# Patient Record
Sex: MALE | Race: WHITE | NOT HISPANIC OR LATINO | Employment: OTHER | URBAN - METROPOLITAN AREA
[De-identification: names, ages, dates, MRNs, and addresses within clinical notes are randomized per-mention and may not be internally consistent; named-entity substitution may affect disease eponyms.]

---

## 2017-03-23 ENCOUNTER — CLINICAL SUPPORT NO REQUIREMENTS (OUTPATIENT)
Dept: CARDIOLOGY | Facility: CLINIC | Age: 67
End: 2017-03-23

## 2017-03-23 DIAGNOSIS — I25.5 ISCHEMIC CARDIOMYOPATHY: ICD-10-CM

## 2017-03-23 PROCEDURE — 93295 DEV INTERROG REMOTE 1/2/MLT: CPT | Performed by: INTERNAL MEDICINE

## 2017-03-23 PROCEDURE — 93296 REM INTERROG EVL PM/IDS: CPT | Performed by: INTERNAL MEDICINE

## 2017-06-22 ENCOUNTER — OFFICE VISIT (OUTPATIENT)
Dept: CARDIOLOGY | Facility: CLINIC | Age: 67
End: 2017-06-22

## 2017-06-22 VITALS — DIASTOLIC BLOOD PRESSURE: 68 MMHG | SYSTOLIC BLOOD PRESSURE: 110 MMHG

## 2017-06-22 DIAGNOSIS — Z95.810 ICD (IMPLANTABLE CARDIOVERTER-DEFIBRILLATOR) IN PLACE: Primary | ICD-10-CM

## 2017-06-22 DIAGNOSIS — I25.5 ISCHEMIC CARDIOMYOPATHY: ICD-10-CM

## 2017-06-22 PROCEDURE — 93289 INTERROG DEVICE EVAL HEART: CPT | Performed by: PHYSICIAN ASSISTANT

## 2017-06-22 NOTE — PROGRESS NOTES
Primary Provider:  Tre Turner MD  CC:ICM      PROBLEM LIST:   1. Coronary artery disease:  a. Exertional dyspnea/angina equivalent.  b. Abnormal EKG showing inferior wall MI of undetermined age and lateral ischemia done as preoperative evaluation before back surgery.  c. Cardiac cath by Dr. Mathews, 10/25/2006, showed severe triple vascular disease with moderate LV dysfunction and ejection fraction of 35% with diastolic dysfunction.  d. Subsequent CABG x4 by Dr. Garcia receiving LIMA to LAD, vein graft to the obtuse marginal, vein to the PDA and vein to the diagonal.  e. MPS, 11/29/2011: No reversible ischemia. Fixed inferior wall and inferolateral wall defects. EF 41%.  2. Ischemic cardiomyopathy:  a. Echocardiogram repeated today, 04/27/2007, shows persistent LV dysfunction with ejection fraction of 35%.  b. Guidant dual chamber ICD implantation, 05/25/2007, Dr. Mcgarry.  c. Cardiolite stress test, November 2008, did not show any significant ischemia, ejection fraction was 40%.  d. Echocardiogram, 11/29/2011: Moderate to severe LV systolic dysfunction, EF 30% - 35%.  e. ICD generator change, 09/19/2013, Dr. Calvo.   3. Hypertension.  4. Dyslipidemia.  5. Chronic back pain.  6. Surgical history:  a. CABG, October 2006  b. Neck surgery    HPI:  The patient present for device check and has no episodes of chest pain, sob, palpitations, or icd shocks.    No Known Allergies    Current Outpatient Prescriptions   Medication Sig Dispense Refill   • aspirin 81 MG EC tablet Take 81 mg by mouth daily.     • carvedilol (COREG) 12.5 MG tablet Take 1 tablet by mouth 2 (two) times a day with meals.     • enalapril (VASOTEC) 10 MG tablet Take 1 tablet by mouth daily.     • NITROGLYCERIN PO Take  by mouth as needed.     • sertraline (ZOLOFT) 100 MG tablet Take 200 mg by mouth daily.     • simvastatin (ZOCOR) 40 MG tablet Take 1 tablet by mouth every night.       No current facility-administered medications for this visit.         /68 (BP Location: Right arm, Patient Position: Sitting)      Device Check    Company Bsc  Mode DDD  Lower Rate 50-bpm  Upper rate -140bpm         Thresholds    Atrial Pacing 0.5Volts@0.5ms  Atrial Sensing 5.8mV  Atrial Impedence 5.8Ohms  Percent Pacing <1    Right Ventricular Pacing 1.4Volts@0.5ms  Right Ventricular Sensing 13.8mV  Right Ventricular Impedence 13.8Ohms  Percent Pacing <1           Tachy Rx    VT1 180 bpm  VT2 ---bpmN  VF >222bpm      Battery Voltage BOLVolts  Longevity 9.5 years  Charge Time 10.2sec  Shock Impedence -    Episodes no significant episodes    Reprogramming no changea    Comments stable icd check     Diagnosis Plan   1. ICD (implantable cardioverter-defibrillator) in place  The patient has stable ICD check.  He would like to return for follow up in one year with a follow up with Dr. Mathews in 6 months.     2. Ischemic cardiomyopathy       Will Bernadette JOLLEY

## 2017-10-12 ENCOUNTER — CLINICAL SUPPORT NO REQUIREMENTS (OUTPATIENT)
Dept: CARDIOLOGY | Facility: CLINIC | Age: 67
End: 2017-10-12

## 2017-10-12 DIAGNOSIS — I25.5 ISCHEMIC CARDIOMYOPATHY: ICD-10-CM

## 2017-10-12 PROCEDURE — 93296 REM INTERROG EVL PM/IDS: CPT | Performed by: INTERNAL MEDICINE

## 2017-10-12 PROCEDURE — 93295 DEV INTERROG REMOTE 1/2/MLT: CPT | Performed by: INTERNAL MEDICINE

## 2017-12-15 ENCOUNTER — OFFICE VISIT (OUTPATIENT)
Dept: CARDIOLOGY | Facility: CLINIC | Age: 67
End: 2017-12-15

## 2017-12-15 VITALS
OXYGEN SATURATION: 98 % | SYSTOLIC BLOOD PRESSURE: 112 MMHG | BODY MASS INDEX: 25.07 KG/M2 | HEIGHT: 68 IN | DIASTOLIC BLOOD PRESSURE: 72 MMHG | WEIGHT: 165.4 LBS | HEART RATE: 67 BPM

## 2017-12-15 DIAGNOSIS — I10 ESSENTIAL HYPERTENSION: ICD-10-CM

## 2017-12-15 DIAGNOSIS — Z95.810 ICD (IMPLANTABLE CARDIOVERTER-DEFIBRILLATOR) IN PLACE: ICD-10-CM

## 2017-12-15 DIAGNOSIS — E78.5 DYSLIPIDEMIA: ICD-10-CM

## 2017-12-15 DIAGNOSIS — I25.5 ISCHEMIC CARDIOMYOPATHY: ICD-10-CM

## 2017-12-15 DIAGNOSIS — I25.810 CORONARY ARTERY DISEASE INVOLVING CORONARY BYPASS GRAFT OF NATIVE HEART WITHOUT ANGINA PECTORIS: Primary | ICD-10-CM

## 2017-12-15 PROCEDURE — 99214 OFFICE O/P EST MOD 30 MIN: CPT | Performed by: INTERNAL MEDICINE

## 2017-12-15 RX ORDER — TRIAMCINOLONE ACETONIDE 5 MG/G
CREAM TOPICAL AS NEEDED
COMMUNITY
Start: 2017-12-13 | End: 2022-05-06

## 2017-12-15 NOTE — PROGRESS NOTES
Encounter Date:12/15/2017      Patient ID: Lui Rodriguez is a 67 y.o. male.    Chief Complaint: Coronary Artery Disease     PROBLEM LIST:    1. Coronary artery disease:  a.  Exertional dyspnea/angina equivalent.  b. Abnormal EKG showing inferior wall MI of undetermined age and lateral ischemia done as preoperative evaluation before back surgery.  c. Cardiac cath by Dr. Mathews, 10/25/2006, showed severe triple vascular disease  with moderate LV dysfunction and ejection fraction of 35% with diastolic dysfunction.  d. Subsequent CABG x4 by Dr. Garcia receiving LIMA to LAD, vein graft to the obtuse marginal, vein to the PDA and vein to the diagonal.  e. MPS, 11/29/2011:  No reversible  ischemia.  Fixed inferior wall and inferolateral wall defects.  EF 41%.  2.  Ischemic cardiomyopathy:  a. Echocardiogram repeated today, 04/27/2007,  shows persistent LV dysfunction with ejection fraction of 35%.  b. Guidant dual chamber ICD implantation, 05/25/2007, Dr. Mcgarry.  c. Cardiolite stress test, November 2008, did not show any significant ischemia, ejection fraction was 40%.  d. Echocardiogram,  11/29/2011:  Moderate to severe LV systolic dysfunction, EF 30% - 35%.  e. ICD generator change, 09/19/2013, Dr. Calvo.      3. Hypertension.  4. Dyslipidemia.  5. Chronic back pain.  6. Surgical history:  a.  CABG, October 2006  b. Neck surgery, 1996, in [place], [place].    History of Present Illness  Patient presents today for follow-up with a history of CAD, ICM and cardiac risk factors. He has been doing well with no new cardiac issues. Denies chest pain, shortness of breath, leg swelling, palpitations, and syncope. Remains busy and active helping his son and doing house hold chores.    No Known Allergies      Current Outpatient Prescriptions:   •  aspirin 81 MG EC tablet, Take 81 mg by mouth daily., Disp: , Rfl:   •  carvedilol (COREG) 12.5 MG tablet, Take 1 tablet by mouth 2 (two) times a day with meals., Disp:  ", Rfl:   •  enalapril (VASOTEC) 10 MG tablet, Take 1 tablet by mouth daily., Disp: , Rfl:   •  NITROGLYCERIN PO, Take  by mouth as needed., Disp: , Rfl:   •  sertraline (ZOLOFT) 100 MG tablet, Take 200 mg by mouth daily., Disp: , Rfl:   •  simvastatin (ZOCOR) 40 MG tablet, Take 1 tablet by mouth every night., Disp: , Rfl:   •  triamcinolone (KENALOG) 0.5 % cream, As Needed., Disp: , Rfl:     The following portions of the patient's history were reviewed and updated as appropriate: allergies, current medications, past family history, past medical history, past social history, past surgical history and problem list.    ROS  Review of Systems   Constitution: Negative for chills, fever, weight gain and weight loss.   Cardiovascular: Negative for chest pain, claudication, dyspnea on exertion, leg swelling, orthopnea, palpitations, paroxysmal nocturnal dyspnea and syncope.        No dizziness   Gastrointestinal: Negative for abdominal pain, constipation, diarrhea, nausea and vomiting.   Genitourinary:        No urinary symptoms   Neurological:        No symptoms of stroke.   All other systems reviewed and are negative.    Objective:     Blood pressure 112/72, pulse 67, height 172.7 cm (68\"), weight 75 kg (165 lb 6.4 oz), SpO2 98 %.      Physical Exam  Constitutional: She appears well-developed and well-nourished.   HENT:   HEENT exam unremarkable.   Neck: Neck supple. No JVD present.   No carotid bruits.   Cardiovascular: Normal rate, regular rhythm and normal heart sounds.    No murmur heard.  2 plus symmetric pulses.   Pulmonary/Chest: Breath sounds normal. Does not exhibit tenderness.   Abdominal:   Abdomen benign.   Musculoskeletal: Does not exhibit edema.   Neurological:   Neurological exam unremarkable.   Vitals reviewed.    Lab Review:   Procedures       Assessment:      Diagnosis Plan   1. Coronary artery disease involving coronary bypass graft of native heart without angina pectoris , Stable and asymptomatic  " Continue current medical regimen.     2. Ischemic cardiomyopathy , Well compensated.   Continue current medical regimen.  We will schedule for future echocardiogram to assess cardiac function.     3. ICD (implantable cardioverter-defibrillator) in place  Followed by Dr. Jarrett    4. Essential hypertension , Controlled.   Continue his beta blocker and ACE inhibitor therapy.     5. Dyslipidemia  Continue Zocor 40 mg      Plan:   Echocardiogram to assess current ejection fraction, he has not been assessed and 6 years.  Stable cardiac status.  Continue current medications.   FU in 12 MO, sooner as needed.  Thank you for allowing us to participate in the care of your patient.     Scribed for Chadd Mathews MD by Mendel Kim. 12/15/2017  1:17 PM    I, Chadd Mathews MD, personally performed the services described in this documentation as scribed by the above named individual in my presence, and it is both accurate and complete.  12/15/2017  1:23 PM        Please note that portions of this note may have been completed with a voice recognition program. Efforts were made to edit the dictations, but occasionally words are mistranscribed.

## 2018-01-02 ENCOUNTER — HOSPITAL ENCOUNTER (OUTPATIENT)
Dept: CARDIOLOGY | Facility: HOSPITAL | Age: 68
Discharge: HOME | End: 2018-01-02
Attending: INTERNAL MEDICINE | Admitting: INTERNAL MEDICINE

## 2018-01-02 VITALS — WEIGHT: 165 LBS | HEIGHT: 68 IN | BODY MASS INDEX: 25.01 KG/M2

## 2018-01-02 DIAGNOSIS — I25.810 CORONARY ARTERY DISEASE INVOLVING CORONARY BYPASS GRAFT OF NATIVE HEART WITHOUT ANGINA PECTORIS: ICD-10-CM

## 2018-01-02 DIAGNOSIS — I25.5 ISCHEMIC CARDIOMYOPATHY: ICD-10-CM

## 2018-01-02 PROCEDURE — 93306 TTE W/DOPPLER COMPLETE: CPT

## 2018-01-02 PROCEDURE — 93306 TTE W/DOPPLER COMPLETE: CPT | Performed by: INTERNAL MEDICINE

## 2018-01-03 LAB
BH CV ECHO MEAS - AO ROOT AREA (BSA CORRECTED): 1.7
BH CV ECHO MEAS - AO ROOT AREA: 8 CM^2
BH CV ECHO MEAS - AO ROOT DIAM: 3.2 CM
BH CV ECHO MEAS - BSA(HAYCOCK): 1.9 M^2
BH CV ECHO MEAS - BSA: 1.9 M^2
BH CV ECHO MEAS - BZI_BMI: 25.1 KILOGRAMS/M^2
BH CV ECHO MEAS - BZI_METRIC_HEIGHT: 172.7 CM
BH CV ECHO MEAS - BZI_METRIC_WEIGHT: 74.8 KG
BH CV ECHO MEAS - EDV(CUBED): 92.3 ML
BH CV ECHO MEAS - EDV(TEICH): 93.4 ML
BH CV ECHO MEAS - EF(CUBED): 39.2 %
BH CV ECHO MEAS - EF(TEICH): 32.4 %
BH CV ECHO MEAS - ESV(CUBED): 56.2 ML
BH CV ECHO MEAS - ESV(TEICH): 63.1 ML
BH CV ECHO MEAS - FS: 15.3 %
BH CV ECHO MEAS - IVS/LVPW: 1.1
BH CV ECHO MEAS - IVSD: 1.2 CM
BH CV ECHO MEAS - LA DIMENSION: 3.9 CM
BH CV ECHO MEAS - LA/AO: 1.2
BH CV ECHO MEAS - LV MASS(C)D: 187.7 GRAMS
BH CV ECHO MEAS - LV MASS(C)DI: 99.7 GRAMS/M^2
BH CV ECHO MEAS - LVIDD: 4.5 CM
BH CV ECHO MEAS - LVIDS: 3.8 CM
BH CV ECHO MEAS - LVPWD: 1.1 CM
BH CV ECHO MEAS - MV A MAX VEL: 90.8 CM/SEC
BH CV ECHO MEAS - MV DEC SLOPE: 285 CM/SEC^2
BH CV ECHO MEAS - MV DEC TIME: 0.22 SEC
BH CV ECHO MEAS - MV E MAX VEL: 71.6 CM/SEC
BH CV ECHO MEAS - MV E/A: 0.79
BH CV ECHO MEAS - MV P1/2T MAX VEL: 64.1 CM/SEC
BH CV ECHO MEAS - MV P1/2T: 65.9 MSEC
BH CV ECHO MEAS - MVA P1/2T LCG: 3.4 CM^2
BH CV ECHO MEAS - MVA(P1/2T): 3.3 CM^2
BH CV ECHO MEAS - PA ACC SLOPE: 450 CM/SEC^2
BH CV ECHO MEAS - PA ACC TIME: 0.15 SEC
BH CV ECHO MEAS - PA PR(ACCEL): 11.1 MMHG
BH CV ECHO MEAS - PI END-D VEL: 156 CM/SEC
BH CV ECHO MEAS - RV MAX PG: 0.88 MMHG
BH CV ECHO MEAS - RV V1 MAX: 46.9 CM/SEC
BH CV ECHO MEAS - SI(CUBED): 19.2 ML/M^2
BH CV ECHO MEAS - SI(TEICH): 16.1 ML/M^2
BH CV ECHO MEAS - SV(CUBED): 36.2 ML
BH CV ECHO MEAS - SV(TEICH): 30.3 ML
BH CV ECHO MEAS - TAPSE (>1.6): 1.6 CM2
BH CV XLRA - RV BASE: 3.6 CM
BH CV XLRA - RV LENGTH: 7.2 CM
BH CV XLRA - RV MID: 3 CM
BH CV XLRA - TDI S': 5.46 CM/SEC
LEFT ATRIUM VOLUME INDEX: 24.5 ML/M2
LEFT ATRIUM VOLUME: 46 CM3
LV EF 2D ECHO EST: 40 %
MAXIMAL PREDICTED HEART RATE: 153 BPM
STRESS TARGET HR: 130 BPM

## 2018-01-17 ENCOUNTER — TELEPHONE (OUTPATIENT)
Dept: CARDIOLOGY | Facility: CLINIC | Age: 68
End: 2018-01-17

## 2018-01-17 DIAGNOSIS — E78.5 HYPERLIPIDEMIA, UNSPECIFIED HYPERLIPIDEMIA TYPE: Primary | ICD-10-CM

## 2018-01-17 RX ORDER — ATORVASTATIN CALCIUM 40 MG/1
40 TABLET, FILM COATED ORAL DAILY
Qty: 90 TABLET | Refills: 1 | Status: SHIPPED | OUTPATIENT
Start: 2018-01-17 | End: 2018-07-13 | Stop reason: SDUPTHER

## 2018-01-17 NOTE — TELEPHONE ENCOUNTER
Spoke with patient and lab results reviewed. He was instructed to stop simvastatin and begin Lipitor 40 mg daily and to recheck labs in 3 months. He is agreeable to the plan and verbalized understanding.

## 2018-01-17 NOTE — TELEPHONE ENCOUNTER
----- Message from Chadd Mathews MD sent at 1/10/2018  7:03 PM EST -----  Labs are acceptable but not in the ideal range and the LDL is higher than 70.  I'm recommending discontinue simvastatin and start Lipitor 40 mg daily, recheck FLP and LFTs in 3 months.

## 2018-02-08 ENCOUNTER — DOCUMENTATION (OUTPATIENT)
Dept: CARDIOLOGY | Facility: CLINIC | Age: 68
End: 2018-02-08

## 2018-04-23 ENCOUNTER — RESULTS ENCOUNTER (OUTPATIENT)
Dept: CARDIOLOGY | Facility: CLINIC | Age: 68
End: 2018-04-23

## 2018-04-23 DIAGNOSIS — E78.5 HYPERLIPIDEMIA, UNSPECIFIED HYPERLIPIDEMIA TYPE: ICD-10-CM

## 2018-06-28 ENCOUNTER — OFFICE VISIT (OUTPATIENT)
Dept: CARDIOLOGY | Facility: CLINIC | Age: 68
End: 2018-06-28

## 2018-06-28 VITALS
WEIGHT: 168 LBS | DIASTOLIC BLOOD PRESSURE: 88 MMHG | HEART RATE: 64 BPM | BODY MASS INDEX: 25.46 KG/M2 | HEIGHT: 68 IN | SYSTOLIC BLOOD PRESSURE: 136 MMHG

## 2018-06-28 DIAGNOSIS — I25.5 ISCHEMIC CARDIOMYOPATHY: Primary | ICD-10-CM

## 2018-06-28 DIAGNOSIS — I10 ESSENTIAL HYPERTENSION: ICD-10-CM

## 2018-06-28 PROCEDURE — 93283 PRGRMG EVAL IMPLANTABLE DFB: CPT | Performed by: NURSE PRACTITIONER

## 2018-06-28 PROCEDURE — 99213 OFFICE O/P EST LOW 20 MIN: CPT | Performed by: NURSE PRACTITIONER

## 2018-06-28 NOTE — PROGRESS NOTES
Subjective:   Lui Rodriguez  1950  [phone]    06/28/2018    [facility] CARDIOLOGY    Matthew Wu MD  [facility] [place] [address]      Patient ID: Lui Rodriguez is a 68 y.o. male.    Chief Complaint: ischemic cardiomyopathy    Problem List:  1. Coronary artery disease:  a. Exertional dyspnea/angina equivalent.  b. Abnormal EKG showing inferior wall MI of undetermined age and lateral ischemia done as preoperative evaluation before back surgery.  c. Cardiac cath by Dr. Mathews, 10/25/2006, showed severe triple vascular disease with moderate LV dysfunction and ejection fraction of 35% with diastolic dysfunction.  d. Subsequent CABG x4 by Dr. Garcia receiving LIMA to LAD, vein graft to the obtuse marginal, vein to the PDA and vein to the diagonal.  e. MPS, 11/29/2011: No reversible ischemia. Fixed inferior wall and inferolateral wall defects. EF 41%.  2. Ischemic cardiomyopathy:  a. Echocardiogram repeated today, 04/27/2007, shows persistent LV dysfunction with ejection fraction of 35%.  b. Guidant dual chamber ICD implantation, 05/25/2007, Dr. Mcgarry.  c. Cardiolite stress test, November 2008, did not show any significant ischemia, ejection fraction was 40%.  d. Echocardiogram, 11/29/2011: Moderate to severe LV systolic dysfunction, EF 30% - 35%.  e. ICD generator change, 09/19/2013, Dr. Calvo.   3. Hypertension.  4. Dyslipidemia.  5. Chronic back pain.  6. Surgical history:  a. CABG, October 2006  b. Neck surgery    No Known Allergies    Current Outpatient Prescriptions:   •  aspirin 81 MG EC tablet, Take 81 mg by mouth daily., Disp: , Rfl:   •  atorvastatin (LIPITOR) 40 MG tablet, Take 1 tablet by mouth Daily., Disp: 90 tablet, Rfl: 1  •  carvedilol (COREG) 12.5 MG tablet, Take 1 tablet by mouth 2 (two) times a day with meals., Disp: , Rfl:   •  enalapril (VASOTEC) 10 MG tablet, Take 1 tablet by mouth daily., Disp: , Rfl:   •  NITROGLYCERIN  "PO, Take  by mouth as needed., Disp: , Rfl:   •  sertraline (ZOLOFT) 100 MG tablet, Take 200 mg by mouth daily., Disp: , Rfl:   •  triamcinolone (KENALOG) 0.5 % cream, As Needed., Disp: , Rfl:     History of Present Illness: Mr. Rodriguez is a 69 y/o male with the above noted past medical history who presents today for follow up on ischemic cardiomyopathy. He is followed by Dr. Mathews, general cardiology. Recent repeat echocardiogram with EF of 40%. He is on optimal medical therpay with coreg and enalapril. He has been doing well. No issues with chest pain, shortness of breath, fevers, chills, night sweats, PND, orthopnea or palpitations. No recent ER visits or hospital stays. BP controlled at home. 120's-140's systolic. .    The following portions of the patient's history were reviewed and updated as appropriate: allergies, current medications, past family history, past medical history, past social history, past surgical history and problem list.    ROS   14 point ROS negative except as outlined in problem list, HPI and other parts of the note.    Procedures       Objective:       Vitals:    06/28/18 1031   BP: 136/88   BP Location: Right arm   Patient Position: Sitting   Pulse: 64   Weight: 76.2 kg (168 lb)   Height: 172.7 cm (68\")     Body mass index is 25.54 kg/m².    Physical Exam:  GENERAL: Well-developed, well-nourished patient in no acute distress.  HEENT: Normocephalic, atraumatic, PERRLA. Moist mucous membranes.  NECK: No JVD present at 30°. No carotid bruits auscultated.  LUNGS: Clear to auscultation.  CARDIOVASCULAR: Regular rate and rhythm. No murmurs, gallops or rubs noted.   ABDOMEN: Soft, nontender. Non-distended. positive bowel sounds.  MUSCULOSKELETAL: No gross deformities. No clubbing, cyanosis, or lower extremity edema.  SKIN: Pink, warm. PPM site without issues.   Neuro: No gross neurologic deficits  Ext: No edema or bruising    The patient's old records including ambulatory rhythm recordings (ECGs, " Holter/event monitor) were reviewed and discussed.      Lab Review:   Results for orders placed or performed during the hospital encounter of 01/02/18   Adult Transthoracic Echo Complete W/ Cont if Necessary Per Protocol   Result Value Ref Range    BSA 1.9 m^2    IVSd 1.2 cm    LVIDd 4.5 cm    LVIDs 3.8 cm    LVPWd 1.1 cm    IVS/LVPW 1.1     FS 15.3 %    EDV(Teich) 93.4 ml    ESV(Teich) 63.1 ml    EF(Teich) 32.4 %    EDV(cubed) 92.3 ml    ESV(cubed) 56.2 ml    EF(cubed) 39.2 %    LV mass(C)d 187.7 grams    LV mass(C)dI 99.7 grams/m^2    SV(Teich) 30.3 ml    SI(Teich) 16.1 ml/m^2    SV(cubed) 36.2 ml    SI(cubed) 19.2 ml/m^2    Ao root diam 3.2 cm    Ao root area 8.0 cm^2    LA dimension 3.9 cm    LA/Ao 1.2     Ao root area (BSA corrected) 1.7     MV E max brandan 71.6 cm/sec    MV A max brandan 90.8 cm/sec    MV E/A 0.79     MV P1/2t max brandan 64.1 cm/sec    MV P1/2t 65.9 msec    MVA(P1/2t) 3.3 cm^2    MV dec slope 285.0 cm/sec^2    MV dec time 0.22 sec    PA acc slope 450.0 cm/sec^2    PA acc time 0.15 sec    PI end-d brandan 156.0 cm/sec    RV V1 max PG 0.88 mmHg    RV V1 max 46.9 cm/sec    PA pr(Accel) 11.1 mmHg    MVA P1/2T LCG 3.4 cm^2     CV ECHO SNEHA - BZI_BMI 25.1 kilograms/m^2     CV ECHO SNEHA - BSA(HAYCOCK) 1.9 m^2     CV ECHO SNEHA - BZI_METRIC_WEIGHT 74.8 kg     CV ECHO SNEHA - BZI_METRIC_HEIGHT 172.7 cm    Target HR (85%) 130 bpm    Max. Pred. HR (100%) 153 bpm    TDI S' 5.46 cm/sec    RV Base 3.60 cm    RV Length 7.20 cm    RV Mid 3.00 cm    LA volume 46.0 cm3    LA Volume Index 24.5 mL/m2    TAPSE (>1.6) 1.60 cm2    Echo EF Estimated 40 %         Device check 6/28/18: RA <1%, RV < 1%, normal threshold and impedance, 85 years left, one episode of NSVT Aug. 2017  Diagnosis:   1. Ischemic cardiomyopathy  2. Essential hypertension  Assessment & Plan:   1) Ischemic cardiomyopathy, s/p DDD ICD with normal function on device check. One episode of NSVT at 181 bpm, self-terminated and asymptomatic. If longer or more  frequent episodes, consider changing medical therapy. On optimal medical therapy. NYHA class I symptoms.     2) HTN, controlled    Plan:  Continue home monitoring  Continue current medical therapy  Keep scheduled follow up with general cardiology  F/U in 6 months for device check.        [facility]EV[facility]E [facility]  6/28/2018  1:49 PM

## 2018-07-13 RX ORDER — ATORVASTATIN CALCIUM 40 MG/1
TABLET, FILM COATED ORAL
Qty: 90 TABLET | Refills: 3 | Status: SHIPPED | OUTPATIENT
Start: 2018-07-13 | End: 2019-07-04 | Stop reason: SDUPTHER

## 2018-08-30 ENCOUNTER — CLINICAL SUPPORT NO REQUIREMENTS (OUTPATIENT)
Dept: CARDIOLOGY | Facility: CLINIC | Age: 68
End: 2018-08-30

## 2018-08-30 DIAGNOSIS — I25.5 ISCHEMIC CARDIOMYOPATHY: ICD-10-CM

## 2018-08-30 PROCEDURE — 93295 DEV INTERROG REMOTE 1/2/MLT: CPT | Performed by: INTERNAL MEDICINE

## 2018-08-30 PROCEDURE — 93296 REM INTERROG EVL PM/IDS: CPT | Performed by: INTERNAL MEDICINE

## 2018-12-06 ENCOUNTER — CLINICAL SUPPORT NO REQUIREMENTS (OUTPATIENT)
Dept: CARDIOLOGY | Facility: CLINIC | Age: 68
End: 2018-12-06

## 2018-12-06 DIAGNOSIS — I25.5 ISCHEMIC CARDIOMYOPATHY: Primary | ICD-10-CM

## 2018-12-06 PROCEDURE — 93296 REM INTERROG EVL PM/IDS: CPT | Performed by: INTERNAL MEDICINE

## 2018-12-06 PROCEDURE — 93295 DEV INTERROG REMOTE 1/2/MLT: CPT | Performed by: INTERNAL MEDICINE

## 2019-01-04 ENCOUNTER — OFFICE VISIT (OUTPATIENT)
Dept: CARDIOLOGY | Facility: CLINIC | Age: 69
End: 2019-01-04

## 2019-01-04 VITALS
SYSTOLIC BLOOD PRESSURE: 100 MMHG | HEIGHT: 68 IN | WEIGHT: 161 LBS | HEART RATE: 65 BPM | BODY MASS INDEX: 24.4 KG/M2 | DIASTOLIC BLOOD PRESSURE: 64 MMHG

## 2019-01-04 DIAGNOSIS — I25.810 CORONARY ARTERY DISEASE INVOLVING CORONARY BYPASS GRAFT OF NATIVE HEART WITHOUT ANGINA PECTORIS: Primary | ICD-10-CM

## 2019-01-04 DIAGNOSIS — E78.5 DYSLIPIDEMIA: ICD-10-CM

## 2019-01-04 DIAGNOSIS — I10 ESSENTIAL HYPERTENSION: ICD-10-CM

## 2019-01-04 DIAGNOSIS — I25.5 ISCHEMIC CARDIOMYOPATHY: ICD-10-CM

## 2019-01-04 PROCEDURE — 99214 OFFICE O/P EST MOD 30 MIN: CPT | Performed by: INTERNAL MEDICINE

## 2019-01-04 NOTE — PROGRESS NOTES
Encounter Date:01/04/2019      Patient ID: Lui Rodriguez is a 68 y.o. male.    Chief Complaint: Coronary artery disease involving coronary bypass graft of n      PROBLEM LIST:  1. Coronary artery disease:  a.  Exertional dyspnea/angina equivalent.  b. Abnormal EKG showing inferior wall MI of undetermined age and lateral ischemia done as preoperative evaluation before back surgery.  c. Cardiac cath by Dr. Mathews, 10/25/2006, showed severe triple vascular disease  with moderate LV dysfunction and ejection fraction of 35% with diastolic dysfunction.  d. Subsequent CABG x4 by Dr. Garcia receiving LIMA to LAD, vein graft to the obtuse marginal, vein to the PDA and vein to the diagonal.  e. MPS, 11/29/2011:  No reversible  ischemia.  Fixed inferior wall and inferolateral wall defects.  EF 41%.  2.  Ischemic cardiomyopathy:  a. Echocardiogram repeated today, 04/27/2007,  shows persistent LV dysfunction with ejection fraction of 35%.  b. Guidant dual chamber ICD implantation, 05/25/2007, Dr. Mcgarry.  c. Cardiolite stress test, November 2008, did not show any significant ischemia, ejection fraction was 40%.  d. Echocardiogram,  11/29/2011:  Moderate to severe LV systolic dysfunction, EF 30% - 35%.  e. ICD generator change, 09/19/2013, Dr. Calvo.  f. Echocardiogram, 01/02/2018: EF 40%. Mild MR, Mild PI, Trace AI and TR  3. Hypertension.  4. Dyslipidemia.  5. Chronic back pain.  6. Surgical history:  a.  CABG, October 2006  b. Neck surgery, 1996, in [place], [place].     History of Present Illness  Patient presents today for annual follow-up with a history of CAD, ischemic cardiomyopathy, and cardiac risk factors. Since last visit, he has been doing well overall from a cardiovascular standpoint. No smoking, no ER visits or hospitalization.  . Denies chest pain, shortness of breath, leg swelling, palpitations, and syncope. Remains busy and active with no limitations.     No Known Allergies      Current  "Outpatient Medications:   •  aspirin 81 MG EC tablet, Take 81 mg by mouth daily., Disp: , Rfl:   •  atorvastatin (LIPITOR) 40 MG tablet, TAKE ONE TABLET BY MOUTH DAILY, Disp: 90 tablet, Rfl: 3  •  carvedilol (COREG) 12.5 MG tablet, Take 1 tablet by mouth 2 (two) times a day with meals., Disp: , Rfl:   •  enalapril (VASOTEC) 10 MG tablet, Take 1 tablet by mouth daily., Disp: , Rfl:   •  NITROGLYCERIN PO, Take  by mouth as needed., Disp: , Rfl:   •  sertraline (ZOLOFT) 100 MG tablet, Take 200 mg by mouth daily., Disp: , Rfl:   •  triamcinolone (KENALOG) 0.5 % cream, As Needed., Disp: , Rfl:     The following portions of the patient's history were reviewed and updated as appropriate: allergies, current medications, past family history, past medical history, past social history, past surgical history and problem list.    ROS  Review of Systems   Constitution: Negative for chills, fever, weight gain and weight loss.   Cardiovascular: Negative for chest pain, claudication, dyspnea on exertion, leg swelling, orthopnea, palpitations, paroxysmal nocturnal dyspnea and syncope.        No dizziness   Gastrointestinal: Negative for abdominal pain, constipation, diarrhea, nausea and vomiting.   Genitourinary:        No urinary symptoms   Neurological:        No symptoms of stroke.   All other systems reviewed and are negative.    Objective:     Blood pressure 100/64, pulse 65, height 172.7 cm (68\"), weight 73 kg (161 lb).      Physical Exam  Constitutional: She appears well-developed and well-nourished.   HENT:   HEENT exam unremarkable.   Neck: Neck supple. No JVD present.   No carotid bruits.   Cardiovascular: Normal rate, regular rhythm and normal heart sounds.    No murmur heard.  2 plus symmetric pulses.   Pulmonary/Chest: Breath sounds normal. Does not exhibit tenderness.   Abdominal:   Abdomen benign.   Musculoskeletal: Does not exhibit edema.   Neurological:   Neurological exam unremarkable.   Vitals reviewed.    Lab " Review:     01/05/2018  HDL 34  LDL 80  Cholesterol 146  Trig 160    Procedures       Assessment:      Diagnosis Plan   1. Coronary artery disease involving coronary bypass graft of native heart without angina pectoris  Stable, continue medications   2. Ischemic cardiomyopathy status post ICD  Stable, EF 40% per last echo, ICD monitored by EP.     3. Essential hypertension  Well-controlled, continue current medications   4. Dyslipidemia  Well-controlled, continue atorvastatin 40mg. monitored by PCP.       Plan:   Stable cardiac status.  Continue current medications.   FU in 12 MO, sooner as needed.  Thank you for allowing us to participate in the care of your patient.     Scribed for Chadd Mathews MD by Babita Solomon. 1/4/2019  1:20 PM      I, Chadd Mathews MD, personally performed the services described in this documentation as scribed by the above named individual in my presence, and it is both accurate and complete.  1/4/2019  1:23 PM        Please note that portions of this note may have been completed with a voice recognition program. Efforts were made to edit the dictations, but occasionally words are mistranscribed.

## 2019-03-07 ENCOUNTER — CLINICAL SUPPORT NO REQUIREMENTS (OUTPATIENT)
Dept: CARDIOLOGY | Facility: CLINIC | Age: 69
End: 2019-03-07

## 2019-03-07 DIAGNOSIS — I25.5 ISCHEMIC CARDIOMYOPATHY: ICD-10-CM

## 2019-03-07 PROCEDURE — 93295 DEV INTERROG REMOTE 1/2/MLT: CPT | Performed by: INTERNAL MEDICINE

## 2019-03-07 PROCEDURE — 93296 REM INTERROG EVL PM/IDS: CPT | Performed by: INTERNAL MEDICINE

## 2019-06-06 ENCOUNTER — CLINICAL SUPPORT NO REQUIREMENTS (OUTPATIENT)
Dept: CARDIOLOGY | Facility: CLINIC | Age: 69
End: 2019-06-06

## 2019-06-06 DIAGNOSIS — I25.5 ISCHEMIC CARDIOMYOPATHY: ICD-10-CM

## 2019-06-06 PROCEDURE — 93295 DEV INTERROG REMOTE 1/2/MLT: CPT | Performed by: INTERNAL MEDICINE

## 2019-06-06 PROCEDURE — 93296 REM INTERROG EVL PM/IDS: CPT | Performed by: INTERNAL MEDICINE

## 2019-07-04 DIAGNOSIS — E78.5 DYSLIPIDEMIA: Primary | ICD-10-CM

## 2019-07-05 RX ORDER — ATORVASTATIN CALCIUM 40 MG/1
TABLET, FILM COATED ORAL
Qty: 90 TABLET | Refills: 0 | Status: SHIPPED | OUTPATIENT
Start: 2019-07-05 | End: 2019-10-03 | Stop reason: SDUPTHER

## 2019-07-05 NOTE — TELEPHONE ENCOUNTER
Refill on Atorvastatin 40 mg daily requested.     Gave patient 90 day refill, lab orders sent via mail (Lipid and CMP)

## 2019-08-01 ENCOUNTER — OFFICE VISIT (OUTPATIENT)
Dept: CARDIOLOGY | Facility: CLINIC | Age: 69
End: 2019-08-01

## 2019-08-01 VITALS
BODY MASS INDEX: 24.25 KG/M2 | SYSTOLIC BLOOD PRESSURE: 110 MMHG | DIASTOLIC BLOOD PRESSURE: 60 MMHG | HEIGHT: 68 IN | HEART RATE: 63 BPM | WEIGHT: 160 LBS

## 2019-08-01 DIAGNOSIS — I10 ESSENTIAL HYPERTENSION: ICD-10-CM

## 2019-08-01 DIAGNOSIS — Z95.810 ICD (IMPLANTABLE CARDIOVERTER-DEFIBRILLATOR) IN PLACE: ICD-10-CM

## 2019-08-01 DIAGNOSIS — I25.810 CORONARY ARTERY DISEASE INVOLVING CORONARY BYPASS GRAFT OF NATIVE HEART WITHOUT ANGINA PECTORIS: ICD-10-CM

## 2019-08-01 DIAGNOSIS — I25.5 ISCHEMIC CARDIOMYOPATHY: Primary | ICD-10-CM

## 2019-08-01 PROCEDURE — 99213 OFFICE O/P EST LOW 20 MIN: CPT | Performed by: NURSE PRACTITIONER

## 2019-08-01 PROCEDURE — 93284 PRGRMG EVAL IMPLANTABLE DFB: CPT | Performed by: NURSE PRACTITIONER

## 2019-08-01 NOTE — PROGRESS NOTES
Subjective:   Lui Rodriguez  1950  [phone]    [facility] CARDIOLOGY    Matthew Wu MD  [address] [place] [address]      Patient ID: Lui Rodriguez is a 69 y.o. male.    Chief Complaint: ischemic cardiomyopathy    Problem List:  1. Coronary artery disease:  a. Exertional dyspnea/angina equivalent.  b. Abnormal EKG showing inferior wall MI of undetermined age and lateral ischemia done as preoperative evaluation before back surgery.  c. Cardiac cath by Dr. Mathews, 10/25/2006, showed severe triple vascular disease with moderate LV dysfunction and ejection fraction of 35% with diastolic dysfunction.  d. Subsequent CABG x4 by Dr. Garcia receiving LIMA to LAD, vein graft to the obtuse marginal, vein to the PDA and vein to the diagonal.  e. MPS, 11/29/2011: No reversible ischemia. Fixed inferior wall and inferolateral wall defects. EF 41%.  2. Ischemic cardiomyopathy:  a. Echocardiogram repeated today, 04/27/2007, shows persistent LV dysfunction with ejection fraction of 35%.  b. Guidant dual chamber ICD implantation, 05/25/2007, Dr. Mcgarry.  c. Cardiolite stress test, November 2008, did not show any significant ischemia, ejection fraction was 40%.  d. Echocardiogram, 11/29/2011: Moderate to severe LV systolic dysfunction, EF 30% - 35%.  e. ICD generator change, 09/19/2013, Dr. Calvo.   3. Hypertension.  4. Dyslipidemia.  5. Chronic back pain.  6. Surgical history:  a. CABG, October 2006  b. Neck surgery    No Known Allergies    Current Outpatient Medications:   •  aspirin 81 MG EC tablet, Take 81 mg by mouth daily., Disp: , Rfl:   •  atorvastatin (LIPITOR) 40 MG tablet, TAKE ONE TABLET BY MOUTH DAILY, Disp: 90 tablet, Rfl: 0  •  carvedilol (COREG) 12.5 MG tablet, Take 1 tablet by mouth 2 (two) times a day with meals., Disp: , Rfl:   •  enalapril (VASOTEC) 10 MG tablet, Take 1 tablet by mouth daily., Disp: , Rfl:   •  NITROGLYCERIN PO, Take  by  "mouth as needed., Disp: , Rfl:   •  sertraline (ZOLOFT) 100 MG tablet, Take 200 mg by mouth daily., Disp: , Rfl:   •  triamcinolone (KENALOG) 0.5 % cream, As Needed., Disp: , Rfl:     History of Present Illness: Mr. Rodriguez is a 68 y/o male with the above noted past medical history who presents today for follow up on ischemic cardiomyopathy. He is followed by Dr. Mathews, general cardiology. Recent repeat echocardiogram with EF of 40%. He is on optimal medical therpay with coreg and enalapril. He continues to do well. Denies chest pain, shortness of breath, fevers, chills, night sweats, PND, orthopnea or palpitations. No recent ER visits or hospital stays. BP controlled at home. 110's-140's systolic.    The following portions of the patient's history were reviewed and updated as appropriate: allergies, current medications, past family history, past medical history, past social history, past surgical history and problem list.    ROS   14 point ROS negative except as outlined in problem list, HPI and other parts of the note.    Procedures       Objective:       Vitals:    08/01/19 1214   BP: 110/60   BP Location: Left arm   Patient Position: Sitting   Pulse: 63   Weight: 72.6 kg (160 lb)   Height: 172.7 cm (68\")     Body mass index is 24.33 kg/m².    Physical Exam:  GENERAL: Well-developed, well-nourished patient in no acute distress.  HEENT: Normocephalic, atraumatic, PERRLA. Moist mucous membranes.  NECK: No JVD present at 30°. No carotid bruits auscultated.  LUNGS: Clear to auscultation.  CARDIOVASCULAR: Regular rate and rhythm. No murmurs, gallops or rubs noted.   ABDOMEN: Soft, nontender. Non-distended. positive bowel sounds.  MUSCULOSKELETAL: No gross deformities. No clubbing, cyanosis, or lower extremity edema.  SKIN: Pink, warm. PPM site without issues.   Neuro: No gross neurologic deficits  Ext: No edema or bruising    The patient's old records including ambulatory rhythm recordings (ECGs, Holter/event monitor) " were reviewed and discussed.      Lab Review:   Results for orders placed or performed during the hospital encounter of 01/02/18   Adult Transthoracic Echo Complete W/ Cont if Necessary Per Protocol   Result Value Ref Range    BSA 1.9 m^2    IVSd 1.2 cm    LVIDd 4.5 cm    LVIDs 3.8 cm    LVPWd 1.1 cm    IVS/LVPW 1.1     FS 15.3 %    EDV(Teich) 93.4 ml    ESV(Teich) 63.1 ml    EF(Teich) 32.4 %    EDV(cubed) 92.3 ml    ESV(cubed) 56.2 ml    EF(cubed) 39.2 %    LV mass(C)d 187.7 grams    LV mass(C)dI 99.7 grams/m^2    SV(Teich) 30.3 ml    SI(Teich) 16.1 ml/m^2    SV(cubed) 36.2 ml    SI(cubed) 19.2 ml/m^2    Ao root diam 3.2 cm    Ao root area 8.0 cm^2    LA dimension 3.9 cm    LA/Ao 1.2     Ao root area (BSA corrected) 1.7     MV E max brandan 71.6 cm/sec    MV A max brandan 90.8 cm/sec    MV E/A 0.79     MV P1/2t max brandan 64.1 cm/sec    MV P1/2t 65.9 msec    MVA(P1/2t) 3.3 cm^2    MV dec slope 285.0 cm/sec^2    MV dec time 0.22 sec    PA acc slope 450.0 cm/sec^2    PA acc time 0.15 sec    PI end-d brandan 156.0 cm/sec    RV V1 max PG 0.88 mmHg    RV V1 max 46.9 cm/sec    PA pr(Accel) 11.1 mmHg    MVA P1/2T LCG 3.4 cm^2     CV ECHO SNEHA - BZI_BMI 25.1 kilograms/m^2     CV ECHO SNEHA - BSA(HAYCOCK) 1.9 m^2     CV ECHO SNEHA - BZI_METRIC_WEIGHT 74.8 kg     CV ECHO SNEHA - BZI_METRIC_HEIGHT 172.7 cm    Target HR (85%) 130 bpm    Max. Pred. HR (100%) 153 bpm    TDI S' 5.46 cm/sec    RV Base 3.60 cm    RV Length 7.20 cm    RV Mid 3.00 cm    LA volume 46.0 cm3    LA Volume Index 24.5 mL/m2    TAPSE (>1.6) 1.60 cm2    Echo EF Estimated 40 %         Device check 8/1/19: RA <1%, RV <1%, normal threshold and impedance, battery life 7-7.5 years, NSVT x1.   Diagnosis:   1. Ischemic cardiomyopathy  2. Essential hypertension  Assessment & Plan:   1) Ischemic cardiomyopathy, s/p DDD ICD with normal function on device check.   One episode of NSVT, self-terminated and asymptomatic.   If longer or more frequent episodes, consider changing  medical therapy. On optimal medical therapy. NYHA class I symptoms.     2) HTN, controlled    Plan:  Continue home monitoring  Continue current medical therapy  Keep scheduled follow up with general cardiology  F/U in 6 months for device check.        GENEVIEVE [facility]  8/1/2019  12:40 PM

## 2019-09-11 ENCOUNTER — CLINICAL SUPPORT NO REQUIREMENTS (OUTPATIENT)
Dept: CARDIOLOGY | Facility: CLINIC | Age: 69
End: 2019-09-11

## 2019-09-11 DIAGNOSIS — I25.5 ISCHEMIC CARDIOMYOPATHY: ICD-10-CM

## 2019-09-11 PROCEDURE — 93295 DEV INTERROG REMOTE 1/2/MLT: CPT | Performed by: INTERNAL MEDICINE

## 2019-09-11 PROCEDURE — 93296 REM INTERROG EVL PM/IDS: CPT | Performed by: INTERNAL MEDICINE

## 2019-10-03 RX ORDER — ATORVASTATIN CALCIUM 40 MG/1
TABLET, FILM COATED ORAL
Qty: 90 TABLET | Refills: 3 | Status: SHIPPED | OUTPATIENT
Start: 2019-10-03 | End: 2020-01-10

## 2019-12-12 ENCOUNTER — CLINICAL SUPPORT NO REQUIREMENTS (OUTPATIENT)
Dept: CARDIOLOGY | Facility: CLINIC | Age: 69
End: 2019-12-12

## 2019-12-12 DIAGNOSIS — I25.5 ISCHEMIC CARDIOMYOPATHY: ICD-10-CM

## 2019-12-12 PROCEDURE — 93296 REM INTERROG EVL PM/IDS: CPT | Performed by: INTERNAL MEDICINE

## 2019-12-12 PROCEDURE — 93295 DEV INTERROG REMOTE 1/2/MLT: CPT | Performed by: INTERNAL MEDICINE

## 2020-01-10 ENCOUNTER — OFFICE VISIT (OUTPATIENT)
Dept: CARDIOLOGY | Facility: CLINIC | Age: 70
End: 2020-01-10

## 2020-01-10 VITALS
SYSTOLIC BLOOD PRESSURE: 124 MMHG | DIASTOLIC BLOOD PRESSURE: 84 MMHG | BODY MASS INDEX: 23.79 KG/M2 | HEIGHT: 68 IN | OXYGEN SATURATION: 95 % | WEIGHT: 157 LBS | HEART RATE: 62 BPM

## 2020-01-10 DIAGNOSIS — E78.5 DYSLIPIDEMIA: ICD-10-CM

## 2020-01-10 DIAGNOSIS — I25.810 CORONARY ARTERY DISEASE INVOLVING CORONARY BYPASS GRAFT OF NATIVE HEART WITHOUT ANGINA PECTORIS: Primary | ICD-10-CM

## 2020-01-10 DIAGNOSIS — I25.5 ISCHEMIC CARDIOMYOPATHY: ICD-10-CM

## 2020-01-10 DIAGNOSIS — I10 ESSENTIAL HYPERTENSION: ICD-10-CM

## 2020-01-10 PROCEDURE — 99214 OFFICE O/P EST MOD 30 MIN: CPT | Performed by: INTERNAL MEDICINE

## 2020-01-10 RX ORDER — FERROUS SULFATE 325(65) MG
TABLET ORAL DAILY
COMMUNITY
Start: 2020-01-09 | End: 2021-01-15

## 2020-01-10 RX ORDER — SIMVASTATIN 40 MG
TABLET ORAL
COMMUNITY
Start: 2020-01-08 | End: 2022-07-14 | Stop reason: SDUPTHER

## 2020-01-10 RX ORDER — LANOLIN ALCOHOL/MO/W.PET/CERES
CREAM (GRAM) TOPICAL DAILY
COMMUNITY
Start: 2020-01-09

## 2020-01-10 NOTE — PROGRESS NOTES
[facility] Cardiology    Encounter Date:01/10/2020        Patient ID: Lui Rodriguez is a 69 y.o. male.  : 1950     PCP: Matthew Wu MD     Chief Complaint: Coronary Artery Disease      PROBLEM LIST:  1. Coronary artery disease:  a. Exertional dyspnea/angina equivalent.  b. Abnormal EKG showing inferior wall MI of undetermined age and lateral ischemia done as preoperative evaluation before back surgery.  c. LHC, 10/25/2006: Severe 3-vessel disease. Moderate LV dysfunction, EF 35% with diastolic dysfunction.  d. CABG x4, , Dr. Garcia: BHATT to LAD, vein graft to the OM, vein to the PDA and vein to the diagonal.  e. MPS, 2011: No reversible ischemia. Fixed inferior wall and inferolateral wall defects. EF 41%.  2.  Ischemic cardiomyopathy:  a. Echocardiogram, 2007: Persistent LV dysfunction with EF 35%.  b. Guidant dual chamber ICD implantation, 2007, Dr. Mcgarry.  c. Cardiolite stress test, 2008: No significant ischemia, EF 40%.  d. Echocardiogram, 2011: Moderate to severe LV systolic dysfunction, EF 30-35%.  e. ICD generator change, 2013, Dr. Calvo.  f. Echocardiogram, 2018: EF 40%. Mild MR/PI. Trace AI/TR  3. Hypertension.  4. Dyslipidemia.  5. Chronic back pain.  6. Surgical history:  a. CABG, 2006  b. Neck surgery, , in [place], [place].    History of Present Illness  Patient presents today for annual follow-up with a history of coronary artery disease, ischemic cardiomyopathy, and cardiac risk factors. Since last visit, he has been feeling well overall from a cardiovascular standpoint. He remains busy and active with redoing parts of his house. He reports occasional back pain when lifting heavy objects. Patient denies chest pain, palpitations, shortness of breath, edema, dizziness, and syncope.       No Known Allergies      Current Outpatient Medications:   •  aspirin 81 MG EC tablet, Take 81 mg  "by mouth daily., Disp: , Rfl:   •  carvedilol (COREG) 12.5 MG tablet, Take 1 tablet by mouth 2 (two) times a day with meals., Disp: , Rfl:   •  enalapril (VASOTEC) 10 MG tablet, Take 1 tablet by mouth daily., Disp: , Rfl:   •  ferrous sulfate 325 (65 FE) MG tablet, Daily., Disp: , Rfl:   •  NITROGLYCERIN PO, Take  by mouth as needed., Disp: , Rfl:   •  sertraline (ZOLOFT) 100 MG tablet, Take 200 mg by mouth daily., Disp: , Rfl:   •  simvastatin (ZOCOR) 40 MG tablet, TAKE ONE TABLET BY MOUTH EVERY EVENING, Disp: , Rfl:   •  triamcinolone (KENALOG) 0.5 % cream, As Needed., Disp: , Rfl:   •  vitamin B-12 (CYANOCOBALAMIN) 1000 MCG tablet, Daily., Disp: , Rfl:     The following portions of the patient's history were reviewed and updated as appropriate: allergies, current medications, past family history, past medical history, past social history, past surgical history and problem list.    ROS  Review of Systems   Constitution: Negative for chills, fatigue, fever, generalized weakness, weight gain and weight loss.   Cardiovascular: Negative for chest pain, claudication, dyspnea on exertion, leg swelling, orthopnea, palpitations, paroxysmal nocturnal dyspnea and syncope.   Respiratory: Negative for cough, shortness of breath, and wheezing.  HENT: Negative for ear pain, nosebleeds, and tinnitus.  Gastrointestinal: Negative for abdominal pain, constipation, diarrhea, nausea and vomiting.   Genitourinary: No urinary symptoms   Musculoskeletal: Negative for muscle cramps.  Neurological: Negative for dizziness, headaches, loss of balance, numbness, and symptoms of stroke.  Psychiatric: Normal mental status.     All other systems reviewed and are negative.    Objective:     /84 (BP Location: Right arm, Patient Position: Sitting)   Pulse 62   Ht 172.7 cm (68\")   Wt 71.2 kg (157 lb)   SpO2 95%   BMI 23.87 kg/m²        Physical Exam  Constitutional: Patient appears well-developed and well-nourished.   HENT: HEENT exam " unremarkable.   Neck: Neck supple. No JVD present. No carotid bruits.   Cardiovascular: Normal rate, regular rhythm and normal heart sounds. No murmur heard.   2+ symmetric pulses.   Pulmonary/Chest: Breath sounds normal. Does not exhibit tenderness.   Abdominal: Abdomen benign.   Musculoskeletal: Does not exhibit edema.   Neurological: Neurological exam unremarkable.   Vitals reviewed.    Lab Review:     Last lipid panel, 07/2019:       HDL 32  LDL 69      Procedures       Assessment:      Diagnosis Plan   1. Coronary artery disease involving coronary bypass graft of native heart without angina pectoris  Stable, continue current medications   2. Ischemic cardiomyopathy  Device monitored by EP (Dr. Garay)   3. Essential hypertension  Well-controlled, continue current medications   4. Dyslipidemia  Well-controlled, continue simvastatin 40 mg     Plan:   Stable cardiac status.  Continue current medications.   FU in 12 MO, sooner as needed.  Thank you for allowing us to participate in the care of your patient.       Scribed for Chadd Mathews MD by Nitza Jang. 1/10/2020  1:10 PM     I, Chadd Mathews MD, personally performed the services described in this documentation as scribed by the above named individual in my presence, and it is both accurate and complete.  1/10/2020  1:26 PM        Please note that portions of this note may have been completed with a voice recognition program. Efforts were made to edit the dictations, but occasionally words are mistranscribed.

## 2020-10-29 ENCOUNTER — APPOINTMENT (OUTPATIENT)
Dept: PREADMISSION TESTING | Facility: HOSPITAL | Age: 70
End: 2020-10-29

## 2020-10-29 LAB
HCT VFR BLD AUTO: 42.1 % (ref 37.5–51)
POTASSIUM SERPL-SCNC: 4.5 MMOL/L (ref 3.5–5.2)
QT INTERVAL: 432 MS
QTC INTERVAL: 466 MS

## 2020-10-29 PROCEDURE — 36415 COLL VENOUS BLD VENIPUNCTURE: CPT

## 2020-10-29 PROCEDURE — 93010 ELECTROCARDIOGRAM REPORT: CPT | Performed by: INTERNAL MEDICINE

## 2020-10-29 PROCEDURE — 84132 ASSAY OF SERUM POTASSIUM: CPT | Performed by: SURGERY

## 2020-10-29 PROCEDURE — 85014 HEMATOCRIT: CPT | Performed by: SURGERY

## 2020-10-29 PROCEDURE — 93005 ELECTROCARDIOGRAM TRACING: CPT

## 2020-11-01 ENCOUNTER — APPOINTMENT (OUTPATIENT)
Dept: PREADMISSION TESTING | Facility: HOSPITAL | Age: 70
End: 2020-11-01

## 2020-11-01 LAB — SARS-COV-2 RNA RESP QL NAA+PROBE: NOT DETECTED

## 2020-11-01 PROCEDURE — U0004 COV-19 TEST NON-CDC HGH THRU: HCPCS

## 2020-11-01 PROCEDURE — C9803 HOPD COVID-19 SPEC COLLECT: HCPCS

## 2020-11-09 ENCOUNTER — APPOINTMENT (OUTPATIENT)
Dept: PREADMISSION TESTING | Facility: HOSPITAL | Age: 70
End: 2020-11-09

## 2020-11-09 PROCEDURE — U0004 COV-19 TEST NON-CDC HGH THRU: HCPCS

## 2020-11-09 PROCEDURE — C9803 HOPD COVID-19 SPEC COLLECT: HCPCS

## 2020-11-10 LAB — SARS-COV-2 RNA RESP QL NAA+PROBE: NOT DETECTED

## 2020-11-11 ENCOUNTER — ANESTHESIA EVENT (OUTPATIENT)
Dept: PERIOP | Facility: HOSPITAL | Age: 70
End: 2020-11-11

## 2020-11-12 ENCOUNTER — ANESTHESIA (OUTPATIENT)
Dept: PERIOP | Facility: HOSPITAL | Age: 70
End: 2020-11-12

## 2020-11-12 ENCOUNTER — HOSPITAL ENCOUNTER (OUTPATIENT)
Facility: HOSPITAL | Age: 70
Setting detail: HOSPITAL OUTPATIENT SURGERY
Discharge: HOME | End: 2020-11-12
Attending: SURGERY | Admitting: ANESTHESIOLOGY

## 2020-11-12 VITALS
DIASTOLIC BLOOD PRESSURE: 79 MMHG | TEMPERATURE: 97.5 F | RESPIRATION RATE: 20 BRPM | SYSTOLIC BLOOD PRESSURE: 155 MMHG | OXYGEN SATURATION: 95 % | HEART RATE: 67 BPM

## 2020-11-12 DIAGNOSIS — K40.91 UNILATERAL RECURRENT INGUINAL HERNIA WITHOUT OBSTRUCTION OR GANGRENE: Primary | ICD-10-CM

## 2020-11-12 LAB
ANION GAP SERPL CALCULATED.3IONS-SCNC: 9 MMOL/L (ref 5–15)
BUN SERPL-MCNC: 24 MG/DL (ref 8–23)
BUN/CREAT SERPL: 22 (ref 7–25)
CALCIUM SPEC-SCNC: 8.8 MG/DL (ref 8.6–10.5)
CHLORIDE SERPL-SCNC: 105 MMOL/L (ref 98–107)
CO2 SERPL-SCNC: 25 MMOL/L (ref 22–29)
CREAT SERPL-MCNC: 1.09 MG/DL (ref 0.76–1.27)
DEPRECATED RDW RBC AUTO: 43.1 FL (ref 37–54)
ERYTHROCYTE [DISTWIDTH] IN BLOOD BY AUTOMATED COUNT: 13.5 % (ref 12.3–15.4)
GFR SERPL CREATININE-BSD FRML MDRD: 67 ML/MIN/1.73
GLUCOSE SERPL-MCNC: 108 MG/DL (ref 65–99)
HCT VFR BLD AUTO: 41 % (ref 37.5–51)
HGB BLD-MCNC: 13.3 G/DL (ref 13–17.7)
MCH RBC QN AUTO: 28.4 PG (ref 26.6–33)
MCHC RBC AUTO-ENTMCNC: 32.4 G/DL (ref 31.5–35.7)
MCV RBC AUTO: 87.4 FL (ref 79–97)
PLATELET # BLD AUTO: 131 10*3/MM3 (ref 140–450)
PMV BLD AUTO: 9.8 FL (ref 6–12)
POTASSIUM SERPL-SCNC: 4.3 MMOL/L (ref 3.5–5.2)
RBC # BLD AUTO: 4.69 10*6/MM3 (ref 4.14–5.8)
SODIUM SERPL-SCNC: 139 MMOL/L (ref 136–145)
WBC # BLD AUTO: 5.77 10*3/MM3 (ref 3.4–10.8)

## 2020-11-12 PROCEDURE — 25010000002 NEOSTIGMINE 10 MG/10ML SOLUTION: Performed by: NURSE ANESTHETIST, CERTIFIED REGISTERED

## 2020-11-12 PROCEDURE — 25010000002 PHENYLEPHRINE PER 1 ML: Performed by: NURSE ANESTHETIST, CERTIFIED REGISTERED

## 2020-11-12 PROCEDURE — C1781 MESH (IMPLANTABLE): HCPCS | Performed by: SURGERY

## 2020-11-12 PROCEDURE — 25010000003 CEFAZOLIN IN DEXTROSE 2-4 GM/100ML-% SOLUTION: Performed by: SURGERY

## 2020-11-12 PROCEDURE — 80048 BASIC METABOLIC PNL TOTAL CA: CPT | Performed by: SURGERY

## 2020-11-12 PROCEDURE — 85027 COMPLETE CBC AUTOMATED: CPT | Performed by: SURGERY

## 2020-11-12 PROCEDURE — 25010000002 FENTANYL CITRATE (PF) 100 MCG/2ML SOLUTION: Performed by: NURSE ANESTHETIST, CERTIFIED REGISTERED

## 2020-11-12 PROCEDURE — 25010000002 PROPOFOL 10 MG/ML EMULSION: Performed by: NURSE ANESTHETIST, CERTIFIED REGISTERED

## 2020-11-12 PROCEDURE — 25010000002 DEXAMETHASONE SODIUM PHOSPHATE 10 MG/ML SOLUTION: Performed by: ANESTHESIOLOGY

## 2020-11-12 DEVICE — MESH FLUT SHT 3X6IN: Type: IMPLANTABLE DEVICE | Site: INGUINAL | Status: FUNCTIONAL

## 2020-11-12 RX ORDER — ROCURONIUM BROMIDE 10 MG/ML
INJECTION, SOLUTION INTRAVENOUS AS NEEDED
Status: DISCONTINUED | OUTPATIENT
Start: 2020-11-12 | End: 2020-11-12 | Stop reason: SURG

## 2020-11-12 RX ORDER — BUPIVACAINE HYDROCHLORIDE 2.5 MG/ML
INJECTION, SOLUTION EPIDURAL; INFILTRATION; INTRACAUDAL
Status: COMPLETED | OUTPATIENT
Start: 2020-11-12 | End: 2020-11-12

## 2020-11-12 RX ORDER — MAGNESIUM HYDROXIDE 1200 MG/15ML
LIQUID ORAL AS NEEDED
Status: DISCONTINUED | OUTPATIENT
Start: 2020-11-12 | End: 2020-11-12 | Stop reason: HOSPADM

## 2020-11-12 RX ORDER — FAMOTIDINE 10 MG/ML
20 INJECTION, SOLUTION INTRAVENOUS ONCE
Status: CANCELLED | OUTPATIENT
Start: 2020-11-12 | End: 2020-11-12

## 2020-11-12 RX ORDER — LABETALOL HYDROCHLORIDE 5 MG/ML
10 INJECTION, SOLUTION INTRAVENOUS
Status: DISCONTINUED | OUTPATIENT
Start: 2020-11-12 | End: 2020-11-12 | Stop reason: HOSPADM

## 2020-11-12 RX ORDER — FOLIC ACID 1 MG/1
1 TABLET ORAL
COMMUNITY
Start: 2020-07-09 | End: 2022-05-06

## 2020-11-12 RX ORDER — LIDOCAINE HYDROCHLORIDE 20 MG/ML
INJECTION, SOLUTION INFILTRATION; PERINEURAL AS NEEDED
Status: DISCONTINUED | OUTPATIENT
Start: 2020-11-12 | End: 2020-11-12 | Stop reason: SURG

## 2020-11-12 RX ORDER — SODIUM CHLORIDE, SODIUM LACTATE, POTASSIUM CHLORIDE, CALCIUM CHLORIDE 600; 310; 30; 20 MG/100ML; MG/100ML; MG/100ML; MG/100ML
9 INJECTION, SOLUTION INTRAVENOUS CONTINUOUS
Status: DISCONTINUED | OUTPATIENT
Start: 2020-11-12 | End: 2020-11-12 | Stop reason: HOSPADM

## 2020-11-12 RX ORDER — DOCUSATE SODIUM 100 MG/1
100 CAPSULE, LIQUID FILLED ORAL 2 TIMES DAILY PRN
Qty: 30 CAPSULE | Refills: 0 | Status: SHIPPED | OUTPATIENT
Start: 2020-11-12 | End: 2021-11-12

## 2020-11-12 RX ORDER — HYDROMORPHONE HYDROCHLORIDE 1 MG/ML
0.5 INJECTION, SOLUTION INTRAMUSCULAR; INTRAVENOUS; SUBCUTANEOUS
Status: DISCONTINUED | OUTPATIENT
Start: 2020-11-12 | End: 2020-11-12 | Stop reason: HOSPADM

## 2020-11-12 RX ORDER — CEFAZOLIN SODIUM 2 G/100ML
2 INJECTION, SOLUTION INTRAVENOUS ONCE
Status: COMPLETED | OUTPATIENT
Start: 2020-11-12 | End: 2020-11-12

## 2020-11-12 RX ORDER — GLYCOPYRROLATE 0.2 MG/ML
INJECTION INTRAMUSCULAR; INTRAVENOUS AS NEEDED
Status: DISCONTINUED | OUTPATIENT
Start: 2020-11-12 | End: 2020-11-12 | Stop reason: SURG

## 2020-11-12 RX ORDER — DEXAMETHASONE SODIUM PHOSPHATE 10 MG/ML
INJECTION, SOLUTION INTRAMUSCULAR; INTRAVENOUS
Status: COMPLETED | OUTPATIENT
Start: 2020-11-12 | End: 2020-11-12

## 2020-11-12 RX ORDER — PROPOFOL 10 MG/ML
VIAL (ML) INTRAVENOUS AS NEEDED
Status: DISCONTINUED | OUTPATIENT
Start: 2020-11-12 | End: 2020-11-12 | Stop reason: SURG

## 2020-11-12 RX ORDER — ONDANSETRON 2 MG/ML
4 INJECTION INTRAMUSCULAR; INTRAVENOUS ONCE AS NEEDED
Status: DISCONTINUED | OUTPATIENT
Start: 2020-11-12 | End: 2020-11-12 | Stop reason: HOSPADM

## 2020-11-12 RX ORDER — DEXAMETHASONE SODIUM PHOSPHATE 4 MG/ML
8 INJECTION, SOLUTION INTRA-ARTICULAR; INTRALESIONAL; INTRAMUSCULAR; INTRAVENOUS; SOFT TISSUE ONCE AS NEEDED
Status: DISCONTINUED | OUTPATIENT
Start: 2020-11-12 | End: 2020-11-12 | Stop reason: HOSPADM

## 2020-11-12 RX ORDER — SODIUM CHLORIDE 0.9 % (FLUSH) 0.9 %
10 SYRINGE (ML) INJECTION AS NEEDED
Status: CANCELLED | OUTPATIENT
Start: 2020-11-12

## 2020-11-12 RX ORDER — FENTANYL CITRATE 50 UG/ML
50 INJECTION, SOLUTION INTRAMUSCULAR; INTRAVENOUS
Status: DISCONTINUED | OUTPATIENT
Start: 2020-11-12 | End: 2020-11-12 | Stop reason: HOSPADM

## 2020-11-12 RX ORDER — SODIUM CHLORIDE 0.9 % (FLUSH) 0.9 %
10 SYRINGE (ML) INJECTION EVERY 12 HOURS SCHEDULED
Status: CANCELLED | OUTPATIENT
Start: 2020-11-12

## 2020-11-12 RX ORDER — ESMOLOL HYDROCHLORIDE 10 MG/ML
INJECTION INTRAVENOUS AS NEEDED
Status: DISCONTINUED | OUTPATIENT
Start: 2020-11-12 | End: 2020-11-12 | Stop reason: SURG

## 2020-11-12 RX ORDER — NEOSTIGMINE METHYLSULFATE 1 MG/ML
INJECTION, SOLUTION INTRAVENOUS AS NEEDED
Status: DISCONTINUED | OUTPATIENT
Start: 2020-11-12 | End: 2020-11-12 | Stop reason: SURG

## 2020-11-12 RX ORDER — OXYCODONE HYDROCHLORIDE AND ACETAMINOPHEN 5; 325 MG/1; MG/1
1-2 TABLET ORAL EVERY 4 HOURS PRN
Qty: 30 TABLET | Refills: 0 | Status: SHIPPED | OUTPATIENT
Start: 2020-11-12 | End: 2021-01-15

## 2020-11-12 RX ORDER — LIDOCAINE HYDROCHLORIDE 10 MG/ML
0.5 INJECTION, SOLUTION EPIDURAL; INFILTRATION; INTRACAUDAL; PERINEURAL ONCE AS NEEDED
Status: COMPLETED | OUTPATIENT
Start: 2020-11-12 | End: 2020-11-12

## 2020-11-12 RX ORDER — FAMOTIDINE 20 MG/1
20 TABLET, FILM COATED ORAL ONCE
Status: COMPLETED | OUTPATIENT
Start: 2020-11-12 | End: 2020-11-12

## 2020-11-12 RX ADMIN — PHENYLEPHRINE HYDROCHLORIDE 80 MCG: 10 INJECTION INTRAVENOUS at 12:09

## 2020-11-12 RX ADMIN — FENTANYL CITRATE 50 MCG: 0.05 INJECTION, SOLUTION INTRAMUSCULAR; INTRAVENOUS at 14:00

## 2020-11-12 RX ADMIN — ESMOLOL HYDROCHLORIDE 20 MG: 10 INJECTION, SOLUTION INTRAVENOUS at 12:17

## 2020-11-12 RX ADMIN — DEXAMETHASONE SODIUM PHOSPHATE 4 MG: 10 INJECTION INTRAMUSCULAR; INTRAVENOUS at 10:55

## 2020-11-12 RX ADMIN — ROCURONIUM BROMIDE 10 MG: 10 INJECTION INTRAVENOUS at 11:53

## 2020-11-12 RX ADMIN — LIDOCAINE HYDROCHLORIDE 0.2 ML: 10 INJECTION, SOLUTION EPIDURAL; INFILTRATION; INTRACAUDAL; PERINEURAL at 10:29

## 2020-11-12 RX ADMIN — PROPOFOL 125 MG: 10 INJECTION, EMULSION INTRAVENOUS at 10:48

## 2020-11-12 RX ADMIN — LABETALOL 20 MG/4 ML (5 MG/ML) INTRAVENOUS SYRINGE 5 MG: at 12:50

## 2020-11-12 RX ADMIN — BUPIVACAINE HYDROCHLORIDE 60 ML: 2.5 INJECTION, SOLUTION EPIDURAL; INFILTRATION; INTRACAUDAL; PERINEURAL at 10:55

## 2020-11-12 RX ADMIN — SODIUM CHLORIDE, POTASSIUM CHLORIDE, SODIUM LACTATE AND CALCIUM CHLORIDE 9 ML/HR: 600; 310; 30; 20 INJECTION, SOLUTION INTRAVENOUS at 10:29

## 2020-11-12 RX ADMIN — LIDOCAINE HYDROCHLORIDE 35 MG: 20 INJECTION, SOLUTION INFILTRATION; PERINEURAL at 10:48

## 2020-11-12 RX ADMIN — FENTANYL CITRATE 50 MCG: 0.05 INJECTION, SOLUTION INTRAMUSCULAR; INTRAVENOUS at 13:38

## 2020-11-12 RX ADMIN — FAMOTIDINE 20 MG: 20 TABLET, FILM COATED ORAL at 10:29

## 2020-11-12 RX ADMIN — NEOSTIGMINE 2.5 MG: 1 INJECTION INTRAVENOUS at 12:12

## 2020-11-12 RX ADMIN — ROCURONIUM BROMIDE 10 MG: 10 INJECTION INTRAVENOUS at 11:05

## 2020-11-12 RX ADMIN — PHENYLEPHRINE HYDROCHLORIDE 80 MCG: 10 INJECTION INTRAVENOUS at 11:02

## 2020-11-12 RX ADMIN — ROCURONIUM BROMIDE 40 MG: 10 INJECTION INTRAVENOUS at 10:55

## 2020-11-12 RX ADMIN — CEFAZOLIN SODIUM 2 G: 2 INJECTION, SOLUTION INTRAVENOUS at 10:42

## 2020-11-12 RX ADMIN — GLYCOPYRROLATE 0.4 MG: 0.2 INJECTION INTRAMUSCULAR; INTRAVENOUS at 12:12

## 2020-11-12 NOTE — ANESTHESIA PROCEDURE NOTES
Airway  Urgency: elective    Date/Time: 11/12/2020 10:56 AM  Airway not difficult    General Information and Staff    Patient location during procedure: OR    Indications and Patient Condition  Indications for airway management: airway protection    Preoxygenated: yes  Mask difficulty assessment: 1 - vent by mask    Final Airway Details  Final airway type: endotracheal airway      Successful airway: ETT  Cuffed: yes   Successful intubation technique: direct laryngoscopy  Endotracheal tube insertion site: oral  Blade: Abreu  Blade size: 2  ETT size (mm): 7.0  Cormack-Lehane Classification: grade I - full view of glottis  Placement verified by: chest auscultation   Measured from: lips  ETT/EBT  to lips (cm): 21  Number of attempts at approach: 1  Assessment: lips, teeth, and gum same as pre-op and atraumatic intubation

## 2020-11-12 NOTE — ANESTHESIA POSTPROCEDURE EVALUATION
Patient: Lui Rodriguez    Procedure Summary     Date: 11/12/20 Room / Location:  TOD OR 05 /  TOD OR    Anesthesia Start: 1042 Anesthesia Stop: 1228    Procedure: INGUINAL HERNIA REPAIR RIGHT WITH MESH (Bilateral Groin) Diagnosis:     Surgeon: Karthik Bain MD Provider: Pepe Velazquez MD    Anesthesia Type: general with block ASA Status: 3          Anesthesia Type: general with block    Vitals  No vitals data found for the desired time range.          Post Anesthesia Care and Evaluation    Patient location during evaluation: PACU  Patient participation: complete - patient participated  Level of consciousness: responsive to verbal stimuli  Pain management: adequate  Airway patency: patent  Anesthetic complications: No anesthetic complications  PONV Status: none  Cardiovascular status: hemodynamically stable and acceptable  Respiratory status: nonlabored ventilation, acceptable and nasal cannula  Hydration status: acceptable

## 2020-11-12 NOTE — OP NOTE
OPERATIVE NOTE    Patient Name:  Lui Rodriguez  YOB: 1950  [id number]    Date of Surgery:  11/12/2020      PREOPERATIVE DIAGNOSIS: Recurrent right inguinal hernia      POSTOPERATIVE DIAGNOSIS: Same        PROCEDURE PERFORMED: Open right inguinal hernia repair with mesh       SURGEON: Karthik Bain MD       ASSISTANT: None        SPECIMENS: None       ANESTHESIA: General.        FINDINGS:   1. Right direct inguinal hernia repaired; no indirect hernia        INDICATIONS: The patient is a 70 y.o. male with a history of bilateral inguinal hernia repairs as an infant who presented with a symptomatic recurrent right inguinal hernia.  The risks and benefits of open repair were discussed at length with the patient and his family and they agreed to proceed.       DESCRIPTION OF PROCEDURE:      After obtaining informed consent, the patient was taken to the operating room and placed in supine  position. After appropriate DVT and antibiotic prophylaxis, general anesthesia was induced. TAP block was placed by the Anesthesia team. The abdomen  was prepped and draped in standard sterile fashion and covered with an Ioban dressing to prevent contact of mesh for the skin.  At this point, a curvilinear incision was made over the right inguinal area through the medial portion of his previous incision.  Electrocautery dissection was carried down to the level of the aponeurosis of the external oblique musculature.  This was divided in direction of its fibers down to and including the external ring.  The ilioinguinal nerve was identified and spared.  A direct inguinal hernia was noted medial to the internal inguinal ring.    The spermatic cord was encircled with a Penrose drain.  The spermatic cord was bluntly dissected.  There was not an indirect sac The cord structures including the vas deferens were identified and spared.  The direct hernia defect was reapproximated using 0 Nurolon stitches. A piece of Prolene  mesh was then brought into the field.  It was tacked inferomedially to the pubic tubercle, laterally to the shelving edge of the inguinal ligament, medially to the conjoined tendon using 0 Nurolon stitches.  A defect was made in the mesh to accommodate the spermatic cord snugly without undue tension.  The ilioinguinal nerve was returned to anatomic positioning.  The Penrose drain was then removed, and the aponeuroses of the external oblique musculature was reapproximated using absorbable suture.  The skin was closed in 2 layers using absorbable suture.  The incision was dressed in standard sterile fashion, and covered with a dry sterile dressing.  The right  testicle was returned to the appropriate hemiscrotum without difficulty.    All sponge and needle counts were correct times two at the completion of the procedure. The patient recovered from anesthesia, was extubated in the operating room  and was transported to the PACU in stable condition.      Karthik Bain MD  11/12/2020  12:32 EST

## 2020-11-12 NOTE — ANESTHESIA PROCEDURE NOTES
Peripheral Block      Patient reassessed immediately prior to procedure    Patient location during procedure: OR  Reason for block: at surgeon's request and post-op pain management  Performed by  Anesthesiologist: Pepe Velazquez MD  Preanesthetic Checklist  Completed: patient identified, site marked, surgical consent, pre-op evaluation, timeout performed, IV checked, risks and benefits discussed and monitors and equipment checked  Prep:  Pt Position: supine  Sterile barriers:cap, gloves, sterile barriers and mask  Prep: ChloraPrep  Patient monitoring: blood pressure monitoring, continuous pulse oximetry and EKG  Procedure  Sedation:yes  Performed under: general  Guidance:ultrasound guided  Images:still images obtained, printed/placed on chart    Laterality:Bilateral  Block Type:TAP  Injection Technique:single-shot  Needle Type:short-bevel and echogenic  Needle Gauge:20 G  Resistance on Injection: none    Medications Used: dexamethasone sodium phosphate injection, 4 mg  bupivacaine PF (MARCAINE) 0.25 % injection, 60 mL  Med admintered at 11/12/2020 10:55 AM      Medications  Comment:Block Injection:  LA dose divided between Right and Left block        Post Assessment  Injection Assessment: negative aspiration for heme, incremental injection and no paresthesia on injection  Patient Tolerance:comfortable throughout block  Complications:no  Additional Notes      Under Ultrasound guidance, a BBraun 4inch 360 degree needle was advanced with Normal Saline hydro dissection of tissue.  The Internal Oblique and Transversus Abdominus muscles where visualized.  At or before the aponeurosis of Internal Oblique, local anesthetic spread was visualized in the Transversus Abdominus Plane. Injection was made incrementally with aspiration every 5 mls.  There was no  intravascular injection,  injection pressure was normal, there was no neural injection, and the procedure was completed without difficulty.  Thank You.

## 2020-11-12 NOTE — INTERVAL H&P NOTE
[facility] Pre-op    Full history and physical note from office is attached.    +cardiac clearance and pacemaker interrogation on chart.    BP (!) 160/109 (BP Location: Right arm, Patient Position: Sitting)   Pulse 66   Temp 98.2 °F (36.8 °C) (Tympanic)   Resp 18   SpO2 97%     Immunizations:  Influenza:  2020  Pneumococcal:  No  Tetanus:  Unknown    LAB Results:  Lab Results   Component Value Date    WBC 5.77 11/12/2020    HGB 13.3 11/12/2020    HCT 41.0 11/12/2020    MCV 87.4 11/12/2020     (L) 11/12/2020    K 4.5 10/29/2020     SARS-CoV-2 AFSHIN   Not Detected Not Detected        Cancer Staging (if applicable)  Cancer Patient: __ yes __no __unknown__N/A; If yes, clinical stage T:__ N:__M:__, stage group or __N/A      Plan: INGUINAL HERNIA REPAIR RIGHT WITH MESH      GENEVIEVE Funes   11/12/2020   10:29 EST

## 2020-11-12 NOTE — ANESTHESIA PREPROCEDURE EVALUATION
Anesthesia Evaluation     Patient summary reviewed and Nursing notes reviewed   NPO Solid Status: > 8 hours  NPO Liquid Status: > 8 hours           Airway   Mallampati: I  TM distance: >3 FB  Neck ROM: full  No difficulty expected  Dental    (+) edentulous    Pulmonary    (+) a smoker (2016 ) Former, COPD (no MDI ) mild,   (-) asthma, shortness of breath  Cardiovascular     ECG reviewed    (+) pacemaker ICD interrogated Yesterday, hypertension, CAD, CABG,   (-) dysrhythmias, angina    ROS comment: NSR LAE  Inferior infarct , age undetermined  ST & T wave abnormality, consider lateral ischemia    ECHO EF 40% mild concentric LVH  Valves essentially normal ·     Neuro/Psych  (-) seizures, CVA  GI/Hepatic/Renal/Endo    (-) liver disease, no renal disease, diabetes, no thyroid disorder    Musculoskeletal     Abdominal    Substance History      OB/GYN          Other            Phys Exam Other: Beard                 Anesthesia Plan    ASA 3     general with block   (Bilat TAPs )  intravenous induction     Anesthetic plan, all risks, benefits, and alternatives have been provided, discussed and informed consent has been obtained with: patient.    Plan discussed with CRNA.

## 2020-11-12 NOTE — BRIEF OP NOTE
INGUINAL HERNIA REPAIR  Progress Note    Lui Melgar Michael  11/12/2020    Pre-op Diagnosis:   Recurrent inguinal hernia of right side without obstruction or gangrene        Post-Op Diagnosis Codes:     * Recurrent inguinal hernia of right side without obstruction or gangrene [K40.91]    Procedure/CPT® Codes:        Procedure(s):  INGUINAL HERNIA REPAIR RIGHT WITH MESH    Surgeon(s):  Karthik Bain MD    Anesthesia: General    Staff:   Circulator: Tani Branch RN  Scrub Person: Elaine Blankenship         Estimated Blood Loss: minimal    Urine Voided: * No values recorded between 11/12/2020 10:41 AM and 11/12/2020 12:23 PM *    Specimens:                None          Drains: * No LDAs found *    Findings: Direct inguinal hernia without indirect component    Complications: none          Karthik Bain MD     Date: 11/12/2020  Time: 12:31 EST

## 2021-01-15 ENCOUNTER — OFFICE VISIT (OUTPATIENT)
Dept: CARDIOLOGY | Facility: CLINIC | Age: 71
End: 2021-01-15

## 2021-01-15 VITALS
SYSTOLIC BLOOD PRESSURE: 138 MMHG | HEIGHT: 67 IN | HEART RATE: 74 BPM | OXYGEN SATURATION: 98 % | BODY MASS INDEX: 24.64 KG/M2 | WEIGHT: 157 LBS | DIASTOLIC BLOOD PRESSURE: 80 MMHG

## 2021-01-15 DIAGNOSIS — Z95.810 ICD (IMPLANTABLE CARDIOVERTER-DEFIBRILLATOR) IN PLACE: ICD-10-CM

## 2021-01-15 DIAGNOSIS — I25.810 CORONARY ARTERY DISEASE INVOLVING CORONARY BYPASS GRAFT OF NATIVE HEART WITHOUT ANGINA PECTORIS: Primary | ICD-10-CM

## 2021-01-15 DIAGNOSIS — E78.5 DYSLIPIDEMIA: ICD-10-CM

## 2021-01-15 DIAGNOSIS — I25.5 ISCHEMIC CARDIOMYOPATHY: ICD-10-CM

## 2021-01-15 DIAGNOSIS — I10 ESSENTIAL HYPERTENSION: ICD-10-CM

## 2021-01-15 PROCEDURE — 99214 OFFICE O/P EST MOD 30 MIN: CPT | Performed by: INTERNAL MEDICINE

## 2021-01-15 PROCEDURE — 93289 INTERROG DEVICE EVAL HEART: CPT | Performed by: INTERNAL MEDICINE

## 2021-01-15 NOTE — PROGRESS NOTES
[facility] Cardiology    Encounter Date: 01/15/2021    Patient ID: Lui Rodriguez is a 70 y.o. male.  : 1950     PCP: Matthew Wu MD       Chief Complaint: Coronary artery disease involving coronary bypass graft of n      PROBLEM LIST:  1. Coronary artery disease:  a. Exertional dyspnea/angina equivalent.  b. Abnormal EKG showing inferior wall MI of undetermined age and lateral ischemia done as preoperative evaluation before back surgery.  c. LHC, 10/25/2006: Severe 3-vessel disease. Moderate LV dysfunction, EF 35% with diastolic dysfunction.  d. CABG x4, , Dr. Garcia: BHATT to LAD, vein graft to the OM, vein to the PDA and vein to the diagonal.  e. MPS, 2011: No reversible ischemia. Fixed inferior wall and inferolateral wall defects. EF 41%.  2.  Ischemic cardiomyopathy:  a. Echocardiogram, 2007: Persistent LV dysfunction with EF 35%.  b. Guidant dual chamber ICD implantation, 2007, Dr. Mcgarry.  c. Cardiolite stress test, 2008: No significant ischemia, EF 40%.  d. Echocardiogram, 2011: Moderate to severe LV systolic dysfunction, EF 30-35%.  e. ICD generator change, 2013, Dr. Calvo.  f. Echocardiogram, 2018: EF 40%. Mild MR/PI. Trace AI/TR  3. Hypertension.  4. Dyslipidemia.  5. Chronic back pain.  6. Surgical history:  a. CABG, 2006  b. Neck surgery, , in [place], [place].    History of Present Illness  Patient presents today for an annual follow-up with a history of coronary artery disease, ischemic cardiomyopathy, and cardiac risk factors. Since last visit, he has been feeling well overall from a cardiovascular standpoint. He notes that he has been staying dormant due to the pandemic but he still tries to stay active. Patient otherwise denies chest pain, shortness of breath, PND, edema, palpitations, syncope, or presyncope at this time.    No Known Allergies      Current Outpatient  Medications:   •  aspirin 81 MG EC tablet, Take 81 mg by mouth daily., Disp: , Rfl:   •  carvedilol (COREG) 12.5 MG tablet, Take 1 tablet by mouth 2 (two) times a day with meals., Disp: , Rfl:   •  Cholecalciferol (vitamin D3) 125 MCG (5000 UT) capsule capsule, Take 5,000 Units by mouth Daily., Disp: , Rfl:   •  docusate sodium (Colace) 100 MG capsule, Take 1 capsule by mouth 2 (Two) Times a Day As Needed for Constipation., Disp: 30 capsule, Rfl: 0  •  enalapril (VASOTEC) 10 MG tablet, Take 1 tablet by mouth daily., Disp: , Rfl:   •  folic acid (FOLVITE) 1 MG tablet, Take 1 tablet by mouth., Disp: , Rfl:   •  NITROGLYCERIN PO, Take  by mouth as needed., Disp: , Rfl:   •  sertraline (ZOLOFT) 100 MG tablet, Take 200 mg by mouth daily., Disp: , Rfl:   •  simvastatin (ZOCOR) 40 MG tablet, TAKE ONE TABLET BY MOUTH EVERY EVENING, Disp: , Rfl:   •  triamcinolone (KENALOG) 0.5 % cream, As Needed., Disp: , Rfl:   •  vitamin B-12 (CYANOCOBALAMIN) 1000 MCG tablet, Daily., Disp: , Rfl:     The following portions of the patient's history were reviewed and updated as appropriate: allergies, current medications, past family history, past medical history, past social history, past surgical history and problem list.    ROS  Review of Systems   Constitution: Negative for chills, fever, fatigue, generalized weakness.   Cardiovascular: Negative for chest pain, dyspnea on exertion, leg swelling, palpitations, orthopnea, and syncope.   Respiratory: Negative for cough, shortness of breath, and wheezing.  HENT: Negative for ear pain, nosebleeds, and tinnitus.  Gastrointestinal: Negative for abdominal pain, constipation, diarrhea, nausea and vomiting.   Genitourinary: No urinary symptoms.  Musculoskeletal: Negative for muscle cramps.  Neurological: Negative for dizziness, headaches, loss of balance, numbness, and symptoms of stroke.  Psychiatric: Normal mental status.     All other systems reviewed and are negative.        Objective:     BP  "138/80 (BP Location: Right arm, Patient Position: Sitting)   Pulse 74   Ht 170.2 cm (67\")   Wt 71.2 kg (157 lb)   SpO2 98%   BMI 24.59 kg/m²      Physical Exam  Constitutional: Patient appears well-developed and well-nourished.   HENT: HEENT exam unremarkable.   Neck: Neck supple. No JVD present. No carotid bruits.   Cardiovascular: Normal rate, regular rhythm and normal heart sounds. No murmur heard.   2+ symmetric pulses.   Pulmonary/Chest: Breath sounds normal. Does not exhibit tenderness.   Abdominal: Abdomen benign.   Musculoskeletal: Does not exhibit edema.   Neurological: Neurological exam unremarkable.   Vitals reviewed.    Data Review:   Lab date: 07/08/2020  • FLP: , , HDL 33, LDL 72    Lab Results   Component Value Date    GLUCOSE 108 (H) 11/12/2020    BUN 24 (H) 11/12/2020    CREATININE 1.09 11/12/2020    EGFRIFNONA 67 11/12/2020    BCR 22.0 11/12/2020    K 4.3 11/12/2020    CO2 25.0 11/12/2020    CALCIUM 8.8 11/12/2020      Lab Results   Component Value Date    WBC 5.77 11/12/2020    RBC 4.69 11/12/2020    HGB 13.3 11/12/2020    HCT 41.0 11/12/2020    MCV 87.4 11/12/2020     (L) 11/12/2020        Procedures   Manual device interrogation, 01/15/21:   Normal, well-functioning [employer] ICD with 5 years of battery life remaining.   Events: 7 NSVT all < 10 seconds. 1 VT - no therapy needed - 192 bpm < 4 sec  Device updates: None        Assessment:      Diagnosis Plan   1. Coronary artery disease involving coronary bypass graft of native heart without angina pectoris  Stable with no current angina; continue aspirin 81 mg daily.    2. Ischemic cardiomyopathy  Stable and asymptomatic; continue current medications.    3. ICD (implantable cardioverter-defibrillator) in place  Normal device check in office today.   4. Essential hypertension  Well-controlled; continue carvedilol and enalapril.   5. Dyslipidemia  Well-controlled; continue simvastatin 40 mg daily.      Plan:   Stable " cardiac status.  No current angina or CHF type symptoms.  Inactive lifestyle recently.    Advised to increase activities and exercise regularly.    Continue current medications.   FU in 6 MO with device check, sooner as needed.  Thank you for allowing us to participate in the care of your patient.     Scribed for Chadd Mathews MD by Anuja Collier. 1/15/2021  14:13 EST      I, Chadd Mathews MD, personally performed the services described in this documentation as scribed by the above named individual in my presence, and it is both accurate and complete.  1/15/2021  17:16 EST      Please note that portions of this note may have been completed with a voice recognition program. Efforts were made to edit the dictations, but occasionally words are mistranscribed.

## 2021-02-20 PROCEDURE — 93296 REM INTERROG EVL PM/IDS: CPT | Performed by: INTERNAL MEDICINE

## 2021-02-20 PROCEDURE — 93295 DEV INTERROG REMOTE 1/2/MLT: CPT | Performed by: INTERNAL MEDICINE

## 2021-05-22 PROCEDURE — 93296 REM INTERROG EVL PM/IDS: CPT | Performed by: INTERNAL MEDICINE

## 2021-05-22 PROCEDURE — 93295 DEV INTERROG REMOTE 1/2/MLT: CPT | Performed by: INTERNAL MEDICINE

## 2021-07-23 ENCOUNTER — OFFICE VISIT (OUTPATIENT)
Dept: CARDIOLOGY | Facility: CLINIC | Age: 71
End: 2021-07-23

## 2021-07-23 VITALS
BODY MASS INDEX: 21.92 KG/M2 | HEART RATE: 80 BPM | OXYGEN SATURATION: 96 % | SYSTOLIC BLOOD PRESSURE: 110 MMHG | WEIGHT: 148 LBS | HEIGHT: 69 IN | DIASTOLIC BLOOD PRESSURE: 58 MMHG

## 2021-07-23 DIAGNOSIS — I25.810 CORONARY ARTERY DISEASE INVOLVING CORONARY BYPASS GRAFT OF NATIVE HEART WITHOUT ANGINA PECTORIS: Primary | ICD-10-CM

## 2021-07-23 DIAGNOSIS — I10 ESSENTIAL HYPERTENSION: ICD-10-CM

## 2021-07-23 DIAGNOSIS — I25.5 ISCHEMIC CARDIOMYOPATHY: ICD-10-CM

## 2021-07-23 DIAGNOSIS — E78.5 DYSLIPIDEMIA: ICD-10-CM

## 2021-07-23 DIAGNOSIS — Z95.810 ICD (IMPLANTABLE CARDIOVERTER-DEFIBRILLATOR) IN PLACE: ICD-10-CM

## 2021-07-23 PROCEDURE — 99214 OFFICE O/P EST MOD 30 MIN: CPT | Performed by: INTERNAL MEDICINE

## 2021-07-23 PROCEDURE — 93289 INTERROG DEVICE EVAL HEART: CPT | Performed by: INTERNAL MEDICINE

## 2021-07-23 NOTE — PROGRESS NOTES
[facility] Cardiology    Encounter Date: 2021    Patient ID: Lui Rodriguez is a 71 y.o. male.  : 1950     PCP: Matthew Wu MD       Chief Complaint: Coronary Artery Disease      PROBLEM LIST:  1. Coronary artery disease:  a. Exertional dyspnea/angina equivalent.  b. Abnormal EKG showing inferior wall MI of undetermined age and lateral ischemia done as preoperative evaluation before back surgery.  c. LHC, 10/25/2006: Severe 3-vessel disease. Moderate LV dysfunction, EF 35% with diastolic dysfunction.  d. CABG x4, , Dr. Garcia: BHATT to LAD, vein graft to the OM, vein to the PDA and vein to the diagonal.  e. MPS, 2011: No reversible ischemia. Fixed inferior wall and inferolateral wall defects. EF 41%.  2.  Ischemic cardiomyopathy:  a. Echocardiogram, 2007: Persistent LV dysfunction with EF 35%.  b. Guidant dual chamber ICD implantation, 2007, Dr. Mcgarry.  c. Cardiolite stress test, 2008: No significant ischemia, EF 40%.  d. Echocardiogram, 2011: Moderate to severe LV systolic dysfunction, EF 30-35%.  e. ICD generator change, 2013, Dr. Calvo.  f. Echocardiogram, 2018: EF 40%. Mild MR/PI. Trace AI/TR  3. Hypertension.  4. Dyslipidemia.  5. Chronic back pain.  6. Surgical history:  a. CABG, 2006  b. Neck surgery, , in [place], [place].    History of Present Illness  Patient presents today for a 6 month follow-up with a history of coronary artery disease, ischemic cardiomyopathy, and cardiac risk factors. Since last visit, the patient has been doing well overall from a cardiovascular standpoint. Patient denies chest pain, shortness of breath, palpitations, edema, dizziness, and syncope. His enalapril dose was reduced from 10 mg to 5 mg due to blood pressure being 90/60 consistently. On 2021 he fell down a flight of steps breaking his collar bone, couple of ribs, right hip and was found to  have a intracranial hemorrhage.  He was managed at [facility] and after prolonged hospitalization and physical therapy he has now become stable.  It is felt that due to his intracranial hemorrhage he has developed some decline in mental function.   During his evaluation at [facility] they incidentally found an abdominal aortic aneurysm with calcification, unsure of size but has not grown on follow-up. He's had home health, and PT/OT. Currently using a walker for ambulation..     No Known Allergies      Current Outpatient Medications:   •  aspirin 81 MG EC tablet, Take 81 mg by mouth daily., Disp: , Rfl:   •  carvedilol (COREG) 12.5 MG tablet, Take 1 tablet by mouth 2 (Two) Times a Day With Meals. 0.5 tablet twice daily, Disp: , Rfl:   •  docusate sodium (Colace) 100 MG capsule, Take 1 capsule by mouth 2 (Two) Times a Day As Needed for Constipation., Disp: 30 capsule, Rfl: 0  •  enalapril (VASOTEC) 10 MG tablet, Take 1 tablet by mouth Daily. 0.5 tablet daily, Disp: , Rfl:   •  NITROGLYCERIN PO, Take  by mouth as needed., Disp: , Rfl:   •  sertraline (ZOLOFT) 100 MG tablet, Take 100 mg by mouth Daily. 2 tablets at night, Disp: , Rfl:   •  simvastatin (ZOCOR) 40 MG tablet, TAKE ONE TABLET BY MOUTH EVERY EVENING, Disp: , Rfl:   •  triamcinolone (KENALOG) 0.5 % cream, As Needed., Disp: , Rfl:   •  Cholecalciferol (vitamin D3) 125 MCG (5000 UT) capsule capsule, Take 5,000 Units by mouth Daily., Disp: , Rfl:   •  folic acid (FOLVITE) 1 MG tablet, Take 1 tablet by mouth., Disp: , Rfl:   •  vitamin B-12 (CYANOCOBALAMIN) 1000 MCG tablet, Daily., Disp: , Rfl:     The following portions of the patient's history were reviewed and updated as appropriate: allergies, current medications, past family history, past medical history, past social history, past surgical history and problem list.    ROS  Review of Systems   Constitution: Negative for chills, fever, fatigue, generalized weakness.   Cardiovascular: Negative for chest pain, dyspnea on  "exertion, leg swelling, palpitations, orthopnea, and syncope.   Respiratory: Negative for cough, shortness of breath, and wheezing.  HENT: Negative for ear pain, nosebleeds, and tinnitus.  Gastrointestinal: Negative for abdominal pain, constipation, diarrhea, nausea and vomiting.   Genitourinary: No urinary symptoms.  Musculoskeletal: Negative for muscle cramps.  Neurological: Negative for dizziness, headaches, loss of balance, numbness, and symptoms of stroke.  Psychiatric: Normal mental status.     All other systems reviewed and are negative.        Objective:     /58 (BP Location: Left arm, Patient Position: Sitting)   Pulse 80   Ht 175.3 cm (69\")   Wt 67.1 kg (148 lb)   SpO2 96%   BMI 21.86 kg/m²      Physical Exam  Constitutional: Patient appears well-developed and well-nourished.   HENT: HEENT exam unremarkable.   Neck: Neck supple. No JVD present. No carotid bruits.   Cardiovascular: Normal rate, regular rhythm and normal heart sounds. No murmur heard.   2+ symmetric pulses.   Pulmonary/Chest: Breath sounds normal. Does not exhibit tenderness.   Abdominal: Abdomen benign.   Musculoskeletal: Does not exhibit edema.   Neurological: Neurological exam unremarkable.   Vitals reviewed.    Data Review:   Lab Results   Component Value Date    GLUCOSE 108 (H) 11/12/2020    BUN 24 (H) 11/12/2020    CREATININE 1.09 11/12/2020    EGFRIFNONA 67 11/12/2020    BCR 22.0 11/12/2020    K 4.3 11/12/2020    CO2 25.0 11/12/2020    CALCIUM 8.8 11/12/2020      Lab Results   Component Value Date    WBC 5.77 11/12/2020    RBC 4.69 11/12/2020    HGB 13.3 11/12/2020    HCT 41.0 11/12/2020    MCV 87.4 11/12/2020     (L) 11/12/2020     Lab date: 07/08/2020  · FLP: , , HDL 33, LDL 72     Procedures     Manual device interrogation, 07/23/21:   Normal, well-functioning [employer] ICD with 4.5-5 years of battery life remaining.   Events: NSVT x11  Device updates: none       Assessment:      Diagnosis Plan "   1. Coronary artery disease involving coronary bypass graft of native heart without angina pectoris  Stable without current anginal symptoms; continue aspirin 81 mg daily.    2. Ischemic cardiomyopathy  Stable and asymptomatic; continue current medications.    3. ICD (implantable cardioverter-defibrillator) in place  Normal functioning ICD with 4.5-5 years of battery life   4. Essential hypertension  Well controlled; continue enalapril 5 mg and carvedilol 12.5 mg   5. Dyslipidemia  Well controlled; continue simvastatin 40 mg     Plan:   Stable cardiac status with normal functioning [employer] ICD.  Continue current medications.   Continue PT lt.  FU in 6 MO with device check, sooner as needed.  Thank you for allowing us to participate in the care of your patient.     Scribed for Chadd Mathews MD by Aleida Yu. 7/23/2021 13:54 EDT      I, Chadd Mathews MD, personally performed the services described in this documentation as scribed by the above named individual in my presence, and it is both accurate and complete.  7/29/2021  05:47 EDT    Please note that portions of this note may have been completed with a voice recognition program. Efforts were made to edit the dictations, but occasionally words are mistranscribed.

## 2021-08-21 PROCEDURE — 93295 DEV INTERROG REMOTE 1/2/MLT: CPT | Performed by: STUDENT IN AN ORGANIZED HEALTH CARE EDUCATION/TRAINING PROGRAM

## 2021-08-21 PROCEDURE — 93296 REM INTERROG EVL PM/IDS: CPT | Performed by: STUDENT IN AN ORGANIZED HEALTH CARE EDUCATION/TRAINING PROGRAM

## 2021-11-05 ENCOUNTER — TELEPHONE (OUTPATIENT)
Dept: CARDIOLOGY | Facility: CLINIC | Age: 71
End: 2021-11-05

## 2021-11-05 NOTE — TELEPHONE ENCOUNTER
Called Mr Michael in regards to [facility] cell adapter upgrade.  Spoke to wife and ordered cell adapter.  Gave instructions on what to do when he receives cell adapter.  Wife verbalized understanding.

## 2021-11-20 PROCEDURE — 93296 REM INTERROG EVL PM/IDS: CPT | Performed by: STUDENT IN AN ORGANIZED HEALTH CARE EDUCATION/TRAINING PROGRAM

## 2021-11-20 PROCEDURE — 93295 DEV INTERROG REMOTE 1/2/MLT: CPT | Performed by: STUDENT IN AN ORGANIZED HEALTH CARE EDUCATION/TRAINING PROGRAM

## 2022-02-19 PROCEDURE — 93296 REM INTERROG EVL PM/IDS: CPT | Performed by: STUDENT IN AN ORGANIZED HEALTH CARE EDUCATION/TRAINING PROGRAM

## 2022-02-19 PROCEDURE — 93295 DEV INTERROG REMOTE 1/2/MLT: CPT | Performed by: STUDENT IN AN ORGANIZED HEALTH CARE EDUCATION/TRAINING PROGRAM

## 2022-05-06 ENCOUNTER — OFFICE VISIT (OUTPATIENT)
Dept: CARDIOLOGY | Facility: CLINIC | Age: 72
End: 2022-05-06

## 2022-05-06 VITALS
OXYGEN SATURATION: 93 % | DIASTOLIC BLOOD PRESSURE: 70 MMHG | BODY MASS INDEX: 23.49 KG/M2 | WEIGHT: 155 LBS | HEIGHT: 68 IN | HEART RATE: 78 BPM | SYSTOLIC BLOOD PRESSURE: 98 MMHG

## 2022-05-06 DIAGNOSIS — I25.5 ISCHEMIC CARDIOMYOPATHY: ICD-10-CM

## 2022-05-06 DIAGNOSIS — I10 ESSENTIAL HYPERTENSION: ICD-10-CM

## 2022-05-06 DIAGNOSIS — E78.5 DYSLIPIDEMIA: ICD-10-CM

## 2022-05-06 DIAGNOSIS — I25.810 CORONARY ARTERY DISEASE INVOLVING CORONARY BYPASS GRAFT OF NATIVE HEART WITHOUT ANGINA PECTORIS: Primary | ICD-10-CM

## 2022-05-06 DIAGNOSIS — Z95.810 ICD (IMPLANTABLE CARDIOVERTER-DEFIBRILLATOR) IN PLACE: ICD-10-CM

## 2022-05-06 PROCEDURE — 99214 OFFICE O/P EST MOD 30 MIN: CPT | Performed by: INTERNAL MEDICINE

## 2022-05-06 NOTE — PROGRESS NOTES
[facility] Cardiology    Encounter Date: 2022    Patient ID: Lui Rodriguez is a 72 y.o. male.  : 1950     PCP: Matthew Wu MD       Chief Complaint: Coronary Artery Disease and Cardiomyopathy      PROBLEM LIST:  1. Coronary artery disease:  a. Exertional dyspnea/angina equivalent.  b. Abnormal EKG showing inferior wall MI of undetermined age and lateral ischemia done as preoperative evaluation before back surgery.  c. LHC, 10/25/2006: Severe 3-vessel disease. Moderate LV dysfunction, EF 35% with diastolic dysfunction.  d. CABG x4, , Dr. Garcia: BHATT to LAD, vein graft to the OM, vein to the PDA and vein to the diagonal.  e. MPS, 2011: No reversible ischemia. Fixed inferior wall and inferolateral wall defects. EF 41%.  2.  Ischemic cardiomyopathy:  a. Echocardiogram, 2007: Persistent LV dysfunction with EF 35%.  b. Guidant dual chamber ICD implantation, 2007, Dr. Mcgarry.  c. Cardiolite stress test, 2008: No significant ischemia, EF 40%.  d. Echocardiogram, 2011: Moderate to severe LV systolic dysfunction, EF 30-35%.  e. ICD generator change, 2013, Dr. Calvo.  f. Echocardiogram, 2018: EF 40%. Mild MR/PI. Trace AI/TR  3. Hypertension.  4. Dyslipidemia.  5. Chronic back pain.  6. Surgical history:  a. CABG, 2006  b. Neck surgery, , in [place], [place].    History of Present Illness  Patient presents today for a 6 month follow-up with a history of coronary artery disease, ischemic cardiomyopathy, and cardiac risk factors. Since last visit, the patient has been doing well overall from a cardiovascular standpoint.  Family reports his walking has progressively gotten worse ever since he has developed frontal lobe dementia.  He walks at home with a walker but sometimes loses balance and falls forward.   He does do minimum walking at the house. He had lab work done by his PCP about 3 months ago  and reports the results were within normal limits.  He was started on vitamin B and D again. Patient denies chest pain, shortness of breath, orthopnea, palpitations, edema, dizziness, and syncope.       No Known Allergies      Current Outpatient Medications:   •  aspirin 81 MG EC tablet, Take 81 mg by mouth daily., Disp: , Rfl:   •  carvedilol (COREG) 12.5 MG tablet, Take 1 tablet by mouth 2 (Two) Times a Day With Meals. 0.5 tablet twice daily, Disp: , Rfl:   •  Cholecalciferol (vitamin D3) 125 MCG (5000 UT) capsule capsule, Take 5,000 Units by mouth Daily., Disp: , Rfl:   •  enalapril (VASOTEC) 10 MG tablet, Take 1 tablet by mouth Daily. 0.5 tablet daily, Disp: , Rfl:   •  sertraline (ZOLOFT) 100 MG tablet, Take 100 mg by mouth Daily. 2 tablets at night, Disp: , Rfl:   •  simvastatin (ZOCOR) 40 MG tablet, TAKE ONE TABLET BY MOUTH EVERY EVENING, Disp: , Rfl:   •  vitamin B-12 (CYANOCOBALAMIN) 1000 MCG tablet, Daily., Disp: , Rfl:     The following portions of the patient's history were reviewed and updated as appropriate: allergies, current medications, past family history, past medical history, past social history, past surgical history and problem list.    ROS  Review of Systems   Constitution: Negative for chills, fever, fatigue, generalized weakness.   Cardiovascular: Negative for chest pain, dyspnea on exertion, leg swelling, palpitations, orthopnea, and syncope.   Respiratory: Negative for cough, shortness of breath, and wheezing.  HENT: Negative for ear pain, nosebleeds, and tinnitus.  Gastrointestinal: Negative for abdominal pain, constipation, diarrhea, nausea and vomiting.   Genitourinary: No urinary symptoms.  Musculoskeletal: Negative for muscle cramps.  Neurological: Negative for dizziness, headaches, loss of balance, numbness, and symptoms of stroke.  Psychiatric: Normal mental status.     All other systems reviewed and are negative.        Objective:     BP 98/70 (BP Location: Right arm, Patient  "Position: Sitting)   Pulse 78   Ht 172.7 cm (68\")   Wt 70.3 kg (155 lb)   SpO2 93%   BMI 23.57 kg/m²      Physical Exam  Constitutional: Patient appears well-developed and well-nourished.   HENT: HEENT exam unremarkable.   Neck: Neck supple. No JVD present. No carotid bruits.   Cardiovascular: Normal rate, regular rhythm and normal heart sounds. No murmur heard.   2+ symmetric pulses.   Pulmonary/Chest: Breath sounds normal. Does not exhibit tenderness.   Abdominal: Abdomen benign.   Musculoskeletal: Does not exhibit edema.   Neurological: Neurological exam unremarkable.   Vitals reviewed.    Data Review:     No recent laboratory studies available for review today.      Procedures             Assessment:      Diagnosis Plan   1. Coronary artery disease involving coronary bypass graft of native heart without angina pectoris  Stable without angina on current activity. Continue on aspirin 81 mg,    2. Ischemic cardiomyopathy  Stable and asymptomatic.  No CHF symptoms, continue current management.   3. ICD (implantable cardioverter-defibrillator) in place  Stable and well functioning ICD monitored by EP service.   4. Essential hypertension  Well controlled. Continue current dose of carvedilol and enalapril.     5. Dyslipidemia   monitored by PCP, labs were on target per report, continue on simvastatin 40 mg.      Plan:   Stable cardiac status.  We will get copies of labs from PCP office for review.  Continue current medications.   FU with PCP for monitoring of ICD, follow-up with me in 12 MO, sooner as needed.  Thank you for allowing us to participate in the care of your patient.     Scribed for Chadd Mathews MD by Jing Silva. 5/6/2022 13:49 EDT      I, Chadd Mathews MD, personally performed the services described in this documentation as scribed by the above named individual in my presence, and it is both accurate and complete.  5/6/2022  14:15 EDT        Please note that portions of this note may have been " completed with a voice recognition program. Efforts were made to edit the dictations, but occasionally words are mistranscribed.

## 2022-05-21 PROCEDURE — 93295 DEV INTERROG REMOTE 1/2/MLT: CPT | Performed by: STUDENT IN AN ORGANIZED HEALTH CARE EDUCATION/TRAINING PROGRAM

## 2022-05-21 PROCEDURE — 93296 REM INTERROG EVL PM/IDS: CPT | Performed by: STUDENT IN AN ORGANIZED HEALTH CARE EDUCATION/TRAINING PROGRAM

## 2022-07-14 ENCOUNTER — TELEMEDICINE (OUTPATIENT)
Dept: FAMILY MEDICINE CLINIC | Facility: CLINIC | Age: 72
End: 2022-07-14

## 2022-07-14 VITALS — DIASTOLIC BLOOD PRESSURE: 86 MMHG | HEART RATE: 67 BPM | SYSTOLIC BLOOD PRESSURE: 132 MMHG

## 2022-07-14 DIAGNOSIS — F41.9 ANXIETY: ICD-10-CM

## 2022-07-14 DIAGNOSIS — R25.2 MUSCLE CRAMPS: ICD-10-CM

## 2022-07-14 DIAGNOSIS — G31.09 FRONTOTEMPORAL DEMENTIA: ICD-10-CM

## 2022-07-14 DIAGNOSIS — Z86.73 HISTORY OF STROKE: ICD-10-CM

## 2022-07-14 DIAGNOSIS — S06.5XAA SDH (SUBDURAL HEMATOMA): Primary | ICD-10-CM

## 2022-07-14 DIAGNOSIS — I10 PRIMARY HYPERTENSION: ICD-10-CM

## 2022-07-14 DIAGNOSIS — E53.8 VITAMIN B12 DEFICIENCY: ICD-10-CM

## 2022-07-14 DIAGNOSIS — I25.5 ISCHEMIC CARDIOMYOPATHY: ICD-10-CM

## 2022-07-14 DIAGNOSIS — Z95.810 ICD (IMPLANTABLE CARDIOVERTER-DEFIBRILLATOR) IN PLACE: ICD-10-CM

## 2022-07-14 DIAGNOSIS — F02.80 FRONTOTEMPORAL DEMENTIA: ICD-10-CM

## 2022-07-14 DIAGNOSIS — E78.2 HYPERLIPIDEMIA, MIXED: ICD-10-CM

## 2022-07-14 DIAGNOSIS — R26.81 GAIT INSTABILITY: ICD-10-CM

## 2022-07-14 PROCEDURE — 99214 OFFICE O/P EST MOD 30 MIN: CPT | Performed by: FAMILY MEDICINE

## 2022-07-14 RX ORDER — SERTRALINE HYDROCHLORIDE 100 MG/1
TABLET, FILM COATED ORAL
Qty: 180 TABLET | Refills: 1 | Status: SHIPPED | OUTPATIENT
Start: 2022-07-14 | End: 2022-12-13 | Stop reason: SDUPTHER

## 2022-07-14 RX ORDER — ENALAPRIL MALEATE 5 MG/1
5 TABLET ORAL DAILY
Qty: 90 TABLET | Refills: 1 | Status: SHIPPED | OUTPATIENT
Start: 2022-07-14 | End: 2022-12-13 | Stop reason: SDUPTHER

## 2022-07-14 RX ORDER — SIMVASTATIN 40 MG
40 TABLET ORAL EVERY EVENING
Qty: 90 TABLET | Refills: 1 | Status: SHIPPED | OUTPATIENT
Start: 2022-07-14 | End: 2022-12-13 | Stop reason: SDUPTHER

## 2022-07-14 RX ORDER — METHOCARBAMOL 500 MG/1
1 TABLET, FILM COATED ORAL NIGHTLY
COMMUNITY
Start: 2022-02-04 | End: 2022-12-13 | Stop reason: SDUPTHER

## 2022-07-14 RX ORDER — CARVEDILOL 6.25 MG/1
6.25 TABLET ORAL 2 TIMES DAILY WITH MEALS
Qty: 180 TABLET | Refills: 1 | Status: SHIPPED | OUTPATIENT
Start: 2022-07-14 | End: 2022-12-13 | Stop reason: SDUPTHER

## 2022-07-14 NOTE — PROGRESS NOTES
This was an audio and video enabled telemedicine encounter.    Chief Complaint  Hypertension and Hyperlipidemia    Subjective    History of Present Illness:  Lui Rodriguez is a 72 y.o. male who presents today for follow-up visit medication refills.    Is the his wife is doing most of the speaking given he had another brain bleed since our last visit he has declined functionally.    He has seen neurology and cardiology since our last visit together and no medication changes were made.    He had problems with hypotension and we reduce his carvedilol and enalapril dosing and he has done better with this.    Anxiety stable with Zoloft.      Objective   Vital Signs:   /86   Pulse 67     Review of Systems   Constitutional: Negative for appetite change, chills and fever.   HENT: Negative for hearing loss.    Eyes: Negative for blurred vision.   Respiratory: Negative for chest tightness.    Cardiovascular: Negative for chest pain.   Gastrointestinal: Negative for abdominal pain.   Musculoskeletal: Positive for gait problem.   Skin: Negative for rash.   Neurological: Positive for speech difficulty, weakness, light-headedness, memory problem and confusion. Negative for syncope.   Psychiatric/Behavioral: Positive for agitation. Negative for hallucinations and depressed mood.       Past History:  Medical History: has a past medical history of Brain bleed (HCC), Chronic back pain, Coronary artery disease, Dementia (HCC), Dyslipidemia, Hip fracture (HCC), Hypertension, and Ischemic cardiomyopathy.   Surgical History: has a past surgical history that includes Coronary artery bypass graft (10/2006); Neck surgery (1996); Cardiac pacemaker placement (05/25/2007); Pacemaker Replacement; Inguinal Hernia Repair (Right, 11/12/2020); and Partial hip arthroplasty.   Family History: family history includes Stroke in his mother.   Social History: reports that he quit smoking about 6 years ago. His smoking use included cigarettes. He  has a 40.00 pack-year smoking history. He has never used smokeless tobacco. Drug use questions deferred to the physician. He reports that he does not drink alcohol.      Current Outpatient Medications:   •  aspirin 81 MG EC tablet, Take 81 mg by mouth daily., Disp: , Rfl:   •  Cholecalciferol (vitamin D3) 125 MCG (5000 UT) capsule capsule, Take 5,000 Units by mouth Daily., Disp: , Rfl:   •  methocarbamol (ROBAXIN) 500 MG tablet, Take 1 tablet by mouth Every Night., Disp: , Rfl:   •  sertraline (ZOLOFT) 100 MG tablet, 2 tablets at night, Disp: 180 tablet, Rfl: 1  •  simvastatin (ZOCOR) 40 MG tablet, Take 1 tablet by mouth Every Evening., Disp: 90 tablet, Rfl: 1  •  vitamin B-12 (CYANOCOBALAMIN) 1000 MCG tablet, Daily., Disp: , Rfl:   •  carvedilol (Coreg) 6.25 MG tablet, Take 1 tablet by mouth 2 (Two) Times a Day With Meals., Disp: 180 tablet, Rfl: 1  •  enalapril (Vasotec) 5 MG tablet, Take 1 tablet by mouth Daily., Disp: 90 tablet, Rfl: 1    Allergies: Mirtazapine    Physical Exam  Constitutional:       General: He is not in acute distress.     Appearance: He is not ill-appearing.      Comments: Chronically ill-appearing.  Resting on couch   HENT:      Head: Normocephalic and atraumatic.      Right Ear: Hearing and external ear normal.      Left Ear: Hearing and external ear normal.      Nose: Nose normal.   Eyes:      Extraocular Movements: Extraocular movements intact.      Conjunctiva/sclera: Conjunctivae normal.      Pupils: Pupils are equal, round, and reactive to light.   Pulmonary:      Effort: Pulmonary effort is normal. No respiratory distress.   Musculoskeletal:         General: Normal range of motion.      Cervical back: Normal range of motion.   Skin:     Findings: No rash.   Neurological:      Comments: Minimal verbal response after repeat subdural hemorrhage.          Result Review                   Assessment and Plan  Diagnoses and all orders for this visit:    1. SDH (subdural hematoma) (HCC)  (Primary)  Assessment & Plan:  Continues neurology follow-up and cardiology follow-up given history of subdural hematoma and frontotemporal dementia.    His wife continues to be excellent caregiver at home and we discussed her need to get out and have some time for herself to help prevent caregiver fatigue.    Good home blood pressure checks with current dosing of enalapril and carvedilol.  His wife wants him to continue with lipid treatment with simvastatin.    Continue Zoloft for anxiety.    He does have his son now living at home to help his wife with his care.      2. Ischemic cardiomyopathy    3. Primary hypertension  -     enalapril (Vasotec) 5 MG tablet; Take 1 tablet by mouth Daily.  Dispense: 90 tablet; Refill: 1  -     carvedilol (Coreg) 6.25 MG tablet; Take 1 tablet by mouth 2 (Two) Times a Day With Meals.  Dispense: 180 tablet; Refill: 1    4. History of stroke    5. Frontotemporal dementia (HCC)    6. Gait instability    7. Anxiety  -     sertraline (ZOLOFT) 100 MG tablet; 2 tablets at night  Dispense: 180 tablet; Refill: 1    8. Hyperlipidemia, mixed  -     simvastatin (ZOCOR) 40 MG tablet; Take 1 tablet by mouth Every Evening.  Dispense: 90 tablet; Refill: 1    9. Vitamin B12 deficiency    10. Muscle cramps    11. ICD (implantable cardioverter-defibrillator) in place      BMI is within normal parameters. No other follow-up for BMI required.          Follow Up  Return in about 3 months (around 10/14/2022) for Medicare Wellness, Med recheck.    Patient was given instructions and counseling regarding his condition or for health maintenance advice. Please see specific information pulled into the AVS if appropriate.     Matthew Wu MD

## 2022-08-10 ENCOUNTER — TELEPHONE (OUTPATIENT)
Dept: CARDIOLOGY | Facility: HOSPITAL | Age: 72
End: 2022-08-10

## 2022-08-10 NOTE — TELEPHONE ENCOUNTER
I attempted to call Mr Rodriguez on his mobile and home telephone numbers, after three rings on both phones it stopped ringing.  I was unable to leave a message for him to send in a manual transmission.

## 2022-08-15 ENCOUNTER — TELEMEDICINE (OUTPATIENT)
Dept: FAMILY MEDICINE CLINIC | Facility: CLINIC | Age: 72
End: 2022-08-15

## 2022-08-15 VITALS
HEART RATE: 68 BPM | BODY MASS INDEX: 23.79 KG/M2 | SYSTOLIC BLOOD PRESSURE: 137 MMHG | DIASTOLIC BLOOD PRESSURE: 85 MMHG | WEIGHT: 157 LBS | HEIGHT: 68 IN

## 2022-08-15 DIAGNOSIS — F41.9 ANXIETY: Chronic | ICD-10-CM

## 2022-08-15 DIAGNOSIS — R26.81 GAIT INSTABILITY: Chronic | ICD-10-CM

## 2022-08-15 DIAGNOSIS — I10 PRIMARY HYPERTENSION: Chronic | ICD-10-CM

## 2022-08-15 DIAGNOSIS — G31.09 FRONTOTEMPORAL DEMENTIA: Primary | Chronic | ICD-10-CM

## 2022-08-15 DIAGNOSIS — Z86.73 HISTORY OF STROKE: Chronic | ICD-10-CM

## 2022-08-15 DIAGNOSIS — F02.80 FRONTOTEMPORAL DEMENTIA: Primary | Chronic | ICD-10-CM

## 2022-08-15 PROCEDURE — 99214 OFFICE O/P EST MOD 30 MIN: CPT | Performed by: FAMILY MEDICINE

## 2022-08-15 NOTE — ASSESSMENT & PLAN NOTE
Stable frontotemporal dementia.  Ongoing neurology follow-up with scan planned next month.    No medication changes will follow-up after his consult with neurology.

## 2022-08-15 NOTE — PROGRESS NOTES
"Patient was seen today through synchronous audio/video technology. Verbal consent was obtained. The patient was located at home. Vitals signs were obtained by patient and recorded.     Chief Complaint  One month follow-up    History of Present Illness  Lui Rodriguez is a 72 y.o. male presenting via video-conference today for follow-up and review of frontotemporal dementia and poststroke issues.    His wife continues to be an exceptional caregiver following his subdural hematoma and stroke with progressive frontotemporal dementia.    She does have a hospital bed and a lift at home.    Anxiety remains stable with Zoloft.  Good home blood pressure checks with current regimen.  Chronic leg spasms stable with Robaxin    The following portions of the patient's history were reviewed and updated as appropriate: allergies, current medications, past family history, past medical history, past social history, past surgical history and problem list.    Review of Systems   Constitutional: Negative for appetite change, chills, fever and unexpected weight change.   HENT: Negative for hearing loss.    Eyes: Negative for visual disturbance.   Respiratory: Negative for chest tightness, shortness of breath and wheezing.    Cardiovascular: Negative for chest pain, palpitations and leg swelling.   Gastrointestinal: Negative for abdominal pain.   Musculoskeletal: Positive for arthralgias and gait problem. Negative for back pain.   Skin: Negative for rash.   Neurological: Positive for weakness. Negative for dizziness and headaches.   Psychiatric/Behavioral: Positive for confusion. Negative for agitation. The patient is not nervous/anxious.        Objective  /85   Pulse 68   Ht 172.7 cm (68\")   Wt 71.2 kg (157 lb)   BMI 23.87 kg/m²     Physical Exam  Constitutional:       General: He is not in acute distress.     Appearance: He is not ill-appearing.   HENT:      Head: Normocephalic and atraumatic.      Right Ear: External ear " normal.      Left Ear: External ear normal.      Nose: Nose normal.   Eyes:      Extraocular Movements: Extraocular movements intact.      Conjunctiva/sclera: Conjunctivae normal.      Pupils: Pupils are equal, round, and reactive to light.   Pulmonary:      Effort: Pulmonary effort is normal. No respiratory distress.   Musculoskeletal:         General: Normal range of motion.      Cervical back: Normal range of motion.   Skin:     Findings: No rash.   Neurological:      Mental Status: He is alert. Mental status is at baseline.   Psychiatric:      Comments: Mood at baseline.           Assessment/Plan   Diagnoses and all orders for this visit:    1. Frontotemporal dementia (HCC) (Primary)  Assessment & Plan:  Stable frontotemporal dementia.  Ongoing neurology follow-up with scan planned next month.    No medication changes will follow-up after his consult with neurology.      2. Primary hypertension  Assessment & Plan:  Hypertension is improving with treatment.  Continue current treatment regimen.  Blood pressure will be reassessed at the next regular appointment.      3. History of stroke    4. Gait instability  Assessment & Plan:  Continues with hospital bed and has a lift to use if any falls happened at home.  Ongoing home therapy with his wife's assistance.      5. Anxiety  Assessment & Plan:  Tete stable with current regimen        No follow-ups on file.    Matthew Wu MD

## 2022-08-15 NOTE — ASSESSMENT & PLAN NOTE
Continues with hospital bed and has a lift to use if any falls happened at home.  Ongoing home therapy with his wife's assistance.

## 2022-08-20 PROCEDURE — 93296 REM INTERROG EVL PM/IDS: CPT | Performed by: STUDENT IN AN ORGANIZED HEALTH CARE EDUCATION/TRAINING PROGRAM

## 2022-08-20 PROCEDURE — 93295 DEV INTERROG REMOTE 1/2/MLT: CPT | Performed by: STUDENT IN AN ORGANIZED HEALTH CARE EDUCATION/TRAINING PROGRAM

## 2022-10-27 ENCOUNTER — TELEPHONE (OUTPATIENT)
Dept: FAMILY MEDICINE CLINIC | Facility: CLINIC | Age: 72
End: 2022-10-27

## 2022-10-27 ENCOUNTER — TELEMEDICINE (OUTPATIENT)
Dept: FAMILY MEDICINE CLINIC | Facility: CLINIC | Age: 72
End: 2022-10-27

## 2022-10-27 VITALS
HEIGHT: 68 IN | HEART RATE: 64 BPM | WEIGHT: 157 LBS | SYSTOLIC BLOOD PRESSURE: 118 MMHG | DIASTOLIC BLOOD PRESSURE: 79 MMHG | BODY MASS INDEX: 23.79 KG/M2

## 2022-10-27 DIAGNOSIS — S06.5XAA SDH (SUBDURAL HEMATOMA): Primary | Chronic | ICD-10-CM

## 2022-10-27 DIAGNOSIS — I10 PRIMARY HYPERTENSION: Chronic | ICD-10-CM

## 2022-10-27 DIAGNOSIS — R25.2 MUSCLE CRAMPS: Chronic | ICD-10-CM

## 2022-10-27 DIAGNOSIS — F41.9 ANXIETY: Chronic | ICD-10-CM

## 2022-10-27 DIAGNOSIS — R26.81 GAIT INSTABILITY: Chronic | ICD-10-CM

## 2022-10-27 DIAGNOSIS — M24.50 CONTRACTURE OF MULTIPLE JOINTS: ICD-10-CM

## 2022-10-27 DIAGNOSIS — R09.81 NASAL CONGESTION: ICD-10-CM

## 2022-10-27 DIAGNOSIS — G31.09 FRONTOTEMPORAL DEMENTIA: Chronic | ICD-10-CM

## 2022-10-27 DIAGNOSIS — Z86.73 HISTORY OF STROKE: Chronic | ICD-10-CM

## 2022-10-27 DIAGNOSIS — F02.80 FRONTOTEMPORAL DEMENTIA: Chronic | ICD-10-CM

## 2022-10-27 PROCEDURE — 99214 OFFICE O/P EST MOD 30 MIN: CPT | Performed by: FAMILY MEDICINE

## 2022-10-27 NOTE — ASSESSMENT & PLAN NOTE
Discussed Atrovent nasal spray and they would like to hold off on any new medications at this time given mild symptoms

## 2022-10-27 NOTE — ASSESSMENT & PLAN NOTE
Reviewed together CT findings done by neurology last week.  No acute changes.    Discussed with patient and his wife that the CT imaging is unlikely to show any progression of his frontotemporal dementia.

## 2022-10-27 NOTE — ASSESSMENT & PLAN NOTE
This family continues to help with gait instability issues and using the lift when needed for transfers

## 2022-10-27 NOTE — PROGRESS NOTES
Patient was seen today through synchronous audio/video technology. Verbal consent was obtained. The patient was located at home. Vitals signs were obtained by patient and recorded.     I was located at our [facility] office for this telehealth visit.    Chief Complaint  Followup and discuss neurology visit    History of Present Illness  Lui Rodriguez is a 72 y.o. male presenting via video-conference today for follow-up and to discuss recent neurology visit and CT scan.    CT scan returned with no acute findings and chronic age-related changes from his longstanding hypertension and hyperlipidemia.    He has continued to do well with his current regimen for hypertension and hyperlipidemia.    Ongoing progressive weakness and lower extremity contractures related to his fall with history of subdural hematoma and stroke with underlying frontotemporal dementia.    His wife remains in unbelievably compassionate caregiver and his sons were also involved in his care.    He did complete a lot of home physical therapy and she does not feel additional therapy at this time would be very beneficial.  They do have to use the Justin lift frequently at home for fall prevention.    The following portions of the patient's history were reviewed and updated as appropriate: allergies, current medications, past family history, past medical history, past social history, past surgical history and problem list.    Review of Systems   Constitutional: Negative for appetite change, chills, fever and unexpected weight change.   HENT: Negative for hearing loss.    Eyes: Negative for visual disturbance.   Respiratory: Negative for chest tightness, shortness of breath and wheezing.    Cardiovascular: Negative for chest pain, palpitations and leg swelling.   Gastrointestinal: Negative for abdominal pain.   Musculoskeletal: Positive for gait problem. Negative for arthralgias and back pain.        Worsening strength in  "lower extremities.  Family having to use the lift more    Skin: Negative for rash.   Neurological: Positive for weakness. Negative for dizziness.   Psychiatric/Behavioral: Negative for agitation and confusion. The patient is not nervous/anxious.        Objective  /79   Pulse 64   Ht 172.7 cm (68\")   Wt 71.2 kg (157 lb)   BMI 23.87 kg/m²     Physical Exam  Constitutional:       General: He is not in acute distress.     Appearance: He is not ill-appearing.      Comments: No distress.  Mental status at baseline.    HENT:      Head: Normocephalic.      Right Ear: External ear normal.      Left Ear: External ear normal.      Nose: Nose normal.   Eyes:      Extraocular Movements: Extraocular movements intact.      Conjunctiva/sclera: Conjunctivae normal.      Pupils: Pupils are equal, round, and reactive to light.   Pulmonary:      Effort: Pulmonary effort is normal. No respiratory distress.   Musculoskeletal:      Cervical back: Normal range of motion.      Comments: Progressive weakness in both lower extremities.    Skin:     Findings: No rash.   Neurological:      Mental Status: He is alert. Mental status is at baseline.   Psychiatric:         Mood and Affect: Mood normal.           Assessment/Plan   Diagnoses and all orders for this visit:    1. SDH (subdural hematoma) (Primary)  Assessment & Plan:  Reviewed together CT findings done by neurology last week.  No acute changes.    Discussed with patient and his wife that the CT imaging is unlikely to show any progression of his frontotemporal dementia.      2. Frontotemporal dementia (HCC)  Assessment & Plan:  Continue neurology follow-up.      3. Gait instability  Assessment & Plan:  This family continues to help with gait instability issues and using the lift when needed for transfers      4. Primary hypertension  Assessment & Plan:  Hypertension is improving with treatment.  Continue current treatment regimen.  Blood pressure will be reassessed at the next " regular appointment.      5. History of stroke  Assessment & Plan:  Continue management with current regimen for hypertension and hyperlipidemia.      6. Muscle cramps    7. Anxiety  Assessment & Plan:  Continue Zoloft      8. Contracture of multiple joints    9. Nasal congestion  Assessment & Plan:  Discussed Atrovent nasal spray and they would like to hold off on any new medications at this time given mild symptoms        No follow-ups on file.    Matthew Wu MD

## 2022-11-11 ENCOUNTER — TELEPHONE (OUTPATIENT)
Dept: FAMILY MEDICINE CLINIC | Facility: CLINIC | Age: 72
End: 2022-11-11

## 2022-11-11 NOTE — TELEPHONE ENCOUNTER
Caller: Tammi Rodriguez    Relationship to patient: Emergency Contact    Best call back number:[phone]    Chief complaint: DEMENTIA     Type of visit: MY CHART VIDEO VISIT/ 2 MONTH FOLLOW UP    Requested date: 12/07, 12/08, 12/09, 12/12,  OR 12/13    If rescheduling, when is the original appointment:     Additional notes: WILL BE OUT OF TOWN STARTING 12/14/22

## 2022-11-19 PROCEDURE — 93296 REM INTERROG EVL PM/IDS: CPT | Performed by: STUDENT IN AN ORGANIZED HEALTH CARE EDUCATION/TRAINING PROGRAM

## 2022-11-19 PROCEDURE — 93295 DEV INTERROG REMOTE 1/2/MLT: CPT | Performed by: STUDENT IN AN ORGANIZED HEALTH CARE EDUCATION/TRAINING PROGRAM

## 2022-11-21 ENCOUNTER — TELEPHONE (OUTPATIENT)
Dept: FAMILY MEDICINE CLINIC | Facility: CLINIC | Age: 72
End: 2022-11-21

## 2022-12-13 ENCOUNTER — TELEMEDICINE (OUTPATIENT)
Dept: FAMILY MEDICINE CLINIC | Facility: CLINIC | Age: 72
End: 2022-12-13

## 2022-12-13 VITALS
WEIGHT: 157 LBS | DIASTOLIC BLOOD PRESSURE: 70 MMHG | HEIGHT: 68 IN | BODY MASS INDEX: 23.79 KG/M2 | HEART RATE: 62 BPM | SYSTOLIC BLOOD PRESSURE: 112 MMHG

## 2022-12-13 DIAGNOSIS — G31.09 FRONTOTEMPORAL DEMENTIA: Primary | Chronic | ICD-10-CM

## 2022-12-13 DIAGNOSIS — R25.2 MUSCLE CRAMPS: Chronic | ICD-10-CM

## 2022-12-13 DIAGNOSIS — R09.89 CHEST CONGESTION: ICD-10-CM

## 2022-12-13 DIAGNOSIS — E78.2 HYPERLIPIDEMIA, MIXED: Chronic | ICD-10-CM

## 2022-12-13 DIAGNOSIS — F02.80 FRONTOTEMPORAL DEMENTIA: Primary | Chronic | ICD-10-CM

## 2022-12-13 DIAGNOSIS — I10 PRIMARY HYPERTENSION: Chronic | ICD-10-CM

## 2022-12-13 DIAGNOSIS — Z86.73 HISTORY OF STROKE: Chronic | ICD-10-CM

## 2022-12-13 DIAGNOSIS — F41.9 ANXIETY: Chronic | ICD-10-CM

## 2022-12-13 PROCEDURE — 99214 OFFICE O/P EST MOD 30 MIN: CPT | Performed by: FAMILY MEDICINE

## 2022-12-13 RX ORDER — SIMVASTATIN 40 MG
40 TABLET ORAL EVERY EVENING
Qty: 90 TABLET | Refills: 1 | Status: SHIPPED | OUTPATIENT
Start: 2022-12-13 | End: 2023-01-30 | Stop reason: SDUPTHER

## 2022-12-13 RX ORDER — METHOCARBAMOL 500 MG/1
500 TABLET, FILM COATED ORAL NIGHTLY
Qty: 90 TABLET | Refills: 1 | Status: SHIPPED | OUTPATIENT
Start: 2022-12-13

## 2022-12-13 RX ORDER — SERTRALINE HYDROCHLORIDE 100 MG/1
TABLET, FILM COATED ORAL
Qty: 180 TABLET | Refills: 1 | Status: SHIPPED | OUTPATIENT
Start: 2022-12-13

## 2022-12-13 RX ORDER — CARVEDILOL 6.25 MG/1
6.25 TABLET ORAL 2 TIMES DAILY WITH MEALS
Qty: 180 TABLET | Refills: 1 | Status: SHIPPED | OUTPATIENT
Start: 2022-12-13

## 2022-12-13 RX ORDER — ENALAPRIL MALEATE 5 MG/1
5 TABLET ORAL DAILY
Qty: 90 TABLET | Refills: 1 | Status: SHIPPED | OUTPATIENT
Start: 2022-12-13

## 2022-12-13 NOTE — PROGRESS NOTES
Patient was seen today through synchronous audio/video technology. Verbal consent was obtained. The patient was located at home. Vitals signs were obtained by patient and recorded.     I was located at our [facility] office for this telehealth visit.    Chief Complaint  Alzheimer's Disease    History of Present Illness  Lui Rodriguez is a 72 y.o. male presenting via video-conference today for follow-up visit and medication refills.    He is seen today with his wife who is his primary caregiver.  His sons also assist with his care.  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    Overall he has been stable with some intermittent chest congestion but no fever or difficulty breathing.    Good home blood pressure checks on Coreg and enalapril.  No problems with Robaxin for muscle spasms.  Irritability remains somewhat manageable with Zoloft but he does have complications from his stroke, frontotemporal dementia, and traumatic brain injury subdural hematoma from his fall.    They do want him to stay on simvastatin for cholesterol and stroke prevention.    The following portions of the patient's history were reviewed and updated as appropriate: allergies, current medications, past family history, past medical history, past social history, past surgical history and problem list.    Review of Systems   Constitutional: Negative for appetite change, chills, fever and unexpected weight change.   HENT: Positive for congestion. Negative for hearing loss.    Eyes: Negative for visual disturbance.   Respiratory: Positive for cough. Negative for chest tightness, shortness of breath and wheezing.         Occasional cough.  Nonproductive.  No respiratory distress.  Wife feels it is more from drainage and clearing mucus.   Cardiovascular: Negative for chest pain, palpitations and leg swelling.  "  Gastrointestinal: Negative for abdominal pain and anal bleeding.   Musculoskeletal: Positive for gait problem.   Skin: Negative for rash.   Neurological: Positive for speech difficulty. Negative for dizziness and headaches.        Unchanged speech difficulty following stroke and progressive frontotemporal dementia.   Psychiatric/Behavioral: Positive for confusion. Negative for agitation. The patient is not nervous/anxious.        Objective  /70   Pulse 62   Ht 172.7 cm (68\")   Wt 71.2 kg (157 lb)   BMI 23.87 kg/m²     Physical Exam  Constitutional:       Comments: Alert.  Responsive to simple questions but at baseline given his neurologic deficits following his stroke and traumatic brain injury following a fall with subdural hematoma.   HENT:      Head: Normocephalic and atraumatic.      Right Ear: External ear normal.      Left Ear: External ear normal.      Nose: Nose normal.   Eyes:      Extraocular Movements: Extraocular movements intact.      Conjunctiva/sclera: Conjunctivae normal.      Pupils: Pupils are equal, round, and reactive to light.   Pulmonary:      Effort: Pulmonary effort is normal. No respiratory distress.   Musculoskeletal:      Cervical back: Normal range of motion.      Comments: Resting on couch.  Chronic problems with contractures following his stroke.  They do use a lift chair to help with transitions.   Skin:     Findings: No rash.   Neurological:      Mental Status: Mental status is at baseline.           Assessment/Plan   Diagnoses and all orders for this visit:    1. Frontotemporal dementia (HCC) (Primary)  Assessment & Plan:  Continue with current regimen.  Ongoing progressive dementia with good family support with his wife and sons as his primary caregivers.  Continue Zoloft for irritability.      2. History of stroke    3. Primary hypertension  Assessment & Plan:  Hypertension is improving with treatment.  Continue current treatment regimen.  Blood pressure will be " reassessed at the next regular appointment.    Orders:  -     carvedilol (Coreg) 6.25 MG tablet; Take 1 tablet by mouth 2 (Two) Times a Day With Meals.  Dispense: 180 tablet; Refill: 1  -     enalapril (Vasotec) 5 MG tablet; Take 1 tablet by mouth Daily.  Dispense: 90 tablet; Refill: 1    4. Hyperlipidemia, mixed  Assessment & Plan:  Continue Zocor.    Orders:  -     simvastatin (ZOCOR) 40 MG tablet; Take 1 tablet by mouth Every Evening.  Dispense: 90 tablet; Refill: 1    5. Muscle cramps  Assessment & Plan:  Stable control with Robaxin    Orders:  -     methocarbamol (ROBAXIN) 500 MG tablet; Take 1 tablet by mouth Every Night.  Dispense: 90 tablet; Refill: 1    6. Anxiety  Assessment & Plan:  Continue Zoloft    Orders:  -     sertraline (ZOLOFT) 100 MG tablet; 2 tablets at night  Dispense: 180 tablet; Refill: 1    7. Chest congestion      Return in about 4 weeks (around 1/10/2023) for Med recheck.    Matthew Wu MD

## 2022-12-13 NOTE — ASSESSMENT & PLAN NOTE
Continue with current regimen.  Ongoing progressive dementia with good family support with his wife and sons as his primary caregivers.  Continue Zoloft for irritability.

## 2023-01-30 ENCOUNTER — TELEMEDICINE (OUTPATIENT)
Dept: FAMILY MEDICINE CLINIC | Facility: CLINIC | Age: 73
End: 2023-01-30
Payer: MEDICARE

## 2023-01-30 VITALS
HEART RATE: 65 BPM | WEIGHT: 155 LBS | SYSTOLIC BLOOD PRESSURE: 103 MMHG | DIASTOLIC BLOOD PRESSURE: 65 MMHG | HEIGHT: 68 IN | BODY MASS INDEX: 23.49 KG/M2

## 2023-01-30 DIAGNOSIS — R15.9 INCONTINENCE OF FECES, UNSPECIFIED FECAL INCONTINENCE TYPE: ICD-10-CM

## 2023-01-30 DIAGNOSIS — Z95.810 ICD (IMPLANTABLE CARDIOVERTER-DEFIBRILLATOR) IN PLACE: Chronic | ICD-10-CM

## 2023-01-30 DIAGNOSIS — F02.80 FRONTOTEMPORAL DEMENTIA: Chronic | ICD-10-CM

## 2023-01-30 DIAGNOSIS — E78.2 HYPERLIPIDEMIA, MIXED: Chronic | ICD-10-CM

## 2023-01-30 DIAGNOSIS — I10 PRIMARY HYPERTENSION: Primary | Chronic | ICD-10-CM

## 2023-01-30 DIAGNOSIS — Z86.73 HISTORY OF STROKE: Chronic | ICD-10-CM

## 2023-01-30 DIAGNOSIS — R26.81 GAIT INSTABILITY: Chronic | ICD-10-CM

## 2023-01-30 DIAGNOSIS — G31.09 FRONTOTEMPORAL DEMENTIA: Chronic | ICD-10-CM

## 2023-01-30 DIAGNOSIS — R39.81 URINARY INCONTINENCE DUE TO COGNITIVE IMPAIRMENT: ICD-10-CM

## 2023-01-30 DIAGNOSIS — I25.5 ISCHEMIC CARDIOMYOPATHY: Chronic | ICD-10-CM

## 2023-01-30 PROCEDURE — 99214 OFFICE O/P EST MOD 30 MIN: CPT | Performed by: FAMILY MEDICINE

## 2023-01-30 RX ORDER — SIMVASTATIN 40 MG
40 TABLET ORAL EVERY EVENING
Qty: 90 TABLET | Refills: 1 | Status: SHIPPED | OUTPATIENT
Start: 2023-01-30

## 2023-01-30 NOTE — ASSESSMENT & PLAN NOTE
Good Home blood pressure checks reviewed.    Hypertension is improving with treatment.  Continue current treatment regimen.  Blood pressure will be reassessed at the next regular appointment.

## 2023-01-30 NOTE — ASSESSMENT & PLAN NOTE
Continues to have excellent home care with his wife and sons.    No concerns or skin breakdown issues

## 2023-01-30 NOTE — PROGRESS NOTES
Patient was seen today through synchronous audio/video technology. Verbal consent was obtained. The patient was located at home. Vitals signs were obtained by patient and recorded.     I was located at our [facility] office for this telehealth visit.    Chief Complaint  Follow-up    History of Present Illness  Lui Rodriguez is a 72 y.o. male presenting via video-conference today for visit medication refills.    Xavier has been doing fairly well since our last appointment together.  His wife was able to get a vacation and have some help from his sons and family members with his care during her absence.     He is seen today with his wife who is his primary caregiver.  His sons also assist with his care.  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      Good home blood pressure checks on Coreg and enalapril.  No problems with Robaxin for muscle spasms.  Irritability remains manageable with Zoloft but he does have complications from his stroke, frontotemporal dementia, and traumatic brain injury subdural hematoma from his fall.     They do want him to stay on simvastatin for cholesterol and stroke prevention.     The following portions of the patient's history were reviewed and updated as appropriate: allergies, current medications, past family history, past medical history, past social history, past surgical history and problem list.    Review of Systems   Constitutional: Negative for appetite change, chills, fever and unexpected weight change.   HENT: Negative for hearing loss.    Eyes: Negative for visual disturbance.   Respiratory: Negative for chest tightness, shortness of breath and wheezing.    Cardiovascular: Negative for chest pain, palpitations and leg swelling.   Gastrointestinal: Negative for abdominal pain, anal bleeding, blood in stool, constipation and diarrhea.        Ongoing  "incontinence of stool and urine   Musculoskeletal: Positive for gait problem.   Skin: Negative for rash.   Neurological: Positive for weakness. Negative for dizziness and headaches.   Psychiatric/Behavioral: Negative for agitation and confusion. The patient is not nervous/anxious.         At baseline.  Pleasantly demented following stroke.  Resting comfortably in hospital bed at home.       Objective  /65   Pulse 65   Ht 172.7 cm (68\")   Wt 70.3 kg (155 lb)   BMI 23.57 kg/m²     Physical Exam  Constitutional:       General: He is not in acute distress.     Appearance: He is not ill-appearing.      Comments: No distress.  Resting comfortably in hospital bed.   HENT:      Head: Normocephalic and atraumatic.      Right Ear: External ear normal.      Left Ear: External ear normal.      Nose: Nose normal.   Eyes:      Extraocular Movements: Extraocular movements intact.      Conjunctiva/sclera: Conjunctivae normal.      Pupils: Pupils are equal, round, and reactive to light.   Pulmonary:      Effort: Pulmonary effort is normal. No respiratory distress.   Musculoskeletal:      Cervical back: Normal range of motion.   Skin:     Findings: No rash.   Neurological:      Mental Status: Mental status is at baseline.      Comments: At baseline.  Pleasantly demented following stroke.  Resting comfortably in hospital bed.           Assessment/Plan   Diagnoses and all orders for this visit:    1. Primary hypertension (Primary)  Assessment & Plan:  Good Home blood pressure checks reviewed.    Hypertension is improving with treatment.  Continue current treatment regimen.  Blood pressure will be reassessed at the next regular appointment.      2. Ischemic cardiomyopathy  Assessment & Plan:  Doing well with medical management.      3. Frontotemporal dementia (HCC)  Assessment & Plan:  Stable controlled current regimen.  Continue Zoloft for anxiety and mood      4. Gait instability  Assessment & Plan:  Resting comfortably in " hospital bed      5. ICD (implantable cardioverter-defibrillator) in place  Assessment & Plan:  No ICD discharges noted since last visit      6. Hyperlipidemia, mixed  Assessment & Plan:  Lipid abnormalities are improving with treatment.  Pharmacotherapy as ordered.  Lipids will be reassessed in 6 months.    Orders:  -     simvastatin (ZOCOR) 40 MG tablet; Take 1 tablet by mouth Every Evening.  Dispense: 90 tablet; Refill: 1    7. Incontinence of feces, unspecified fecal incontinence type  Assessment & Plan:  Continues to have excellent home care with his wife and sons.    No concerns or skin breakdown issues      8. Urinary incontinence due to cognitive impairment  Assessment & Plan:  See above      9. History of stroke      BMI is within normal parameters. No other follow-up for BMI required.       Return in about 3 months (around 4/30/2023) for Med recheck.    Matthew Wu MD

## 2023-02-18 PROCEDURE — 93296 REM INTERROG EVL PM/IDS: CPT | Performed by: STUDENT IN AN ORGANIZED HEALTH CARE EDUCATION/TRAINING PROGRAM

## 2023-02-18 PROCEDURE — 93295 DEV INTERROG REMOTE 1/2/MLT: CPT | Performed by: STUDENT IN AN ORGANIZED HEALTH CARE EDUCATION/TRAINING PROGRAM

## 2023-04-10 ENCOUNTER — TELEMEDICINE (OUTPATIENT)
Dept: FAMILY MEDICINE CLINIC | Facility: CLINIC | Age: 73
End: 2023-04-10
Payer: MEDICARE

## 2023-04-10 VITALS
SYSTOLIC BLOOD PRESSURE: 113 MMHG | HEIGHT: 68 IN | HEART RATE: 62 BPM | WEIGHT: 153 LBS | BODY MASS INDEX: 23.19 KG/M2 | DIASTOLIC BLOOD PRESSURE: 84 MMHG

## 2023-04-10 DIAGNOSIS — I10 PRIMARY HYPERTENSION: Chronic | ICD-10-CM

## 2023-04-10 DIAGNOSIS — F02.80 FRONTOTEMPORAL DEMENTIA: Chronic | ICD-10-CM

## 2023-04-10 DIAGNOSIS — M24.50 CONTRACTURE OF MULTIPLE JOINTS: Primary | Chronic | ICD-10-CM

## 2023-04-10 DIAGNOSIS — R25.2 MUSCLE CRAMPS: Chronic | ICD-10-CM

## 2023-04-10 DIAGNOSIS — Z86.73 HISTORY OF STROKE: Chronic | ICD-10-CM

## 2023-04-10 DIAGNOSIS — G31.09 FRONTOTEMPORAL DEMENTIA: Chronic | ICD-10-CM

## 2023-04-10 DIAGNOSIS — F41.9 ANXIETY: Chronic | ICD-10-CM

## 2023-04-10 DIAGNOSIS — E78.2 HYPERLIPIDEMIA, MIXED: Chronic | ICD-10-CM

## 2023-04-10 RX ORDER — SIMVASTATIN 40 MG
40 TABLET ORAL EVERY EVENING
Qty: 90 TABLET | Refills: 1 | Status: SHIPPED | OUTPATIENT
Start: 2023-04-10

## 2023-04-10 RX ORDER — ENALAPRIL MALEATE 5 MG/1
5 TABLET ORAL DAILY
Qty: 90 TABLET | Refills: 1 | Status: SHIPPED | OUTPATIENT
Start: 2023-04-10

## 2023-04-10 RX ORDER — METHOCARBAMOL 500 MG/1
500 TABLET, FILM COATED ORAL NIGHTLY
Qty: 90 TABLET | Refills: 1 | Status: SHIPPED | OUTPATIENT
Start: 2023-04-10

## 2023-04-10 RX ORDER — CARVEDILOL 6.25 MG/1
6.25 TABLET ORAL 2 TIMES DAILY WITH MEALS
Qty: 180 TABLET | Refills: 1 | Status: SHIPPED | OUTPATIENT
Start: 2023-04-10

## 2023-04-10 RX ORDER — SERTRALINE HYDROCHLORIDE 100 MG/1
TABLET, FILM COATED ORAL
Qty: 180 TABLET | Refills: 1 | Status: SHIPPED | OUTPATIENT
Start: 2023-04-10

## 2023-04-10 NOTE — PROGRESS NOTES
Patient was seen today through synchronous audio/video technology. Verbal consent was obtained. The patient was located at home. Vitals signs were obtained by patient and recorded.     I was located at our [facility] office for this telehealth visit.    Chief Complaint  Follow-up    History of Present Illness  Lui Rodriguez is a 73 y.o. male presenting via video-conference today for follow-up and medication refills.    Xavier is accompanied by his wife and son.  They are his primary caregivers after his stroke and fall with traumatic brain injury.    Overall has been doing well with no new significant problems since her last visit.  He does have contractures developing due to chronic inactivity and they feel they have maximized benefit from home physical and occupational therapy.  We did discuss restarting therapy to help with contractures but they would like to hold off at this point on ongoing home therapy.    Mood stable with Zoloft at current dosing.  Good blood pressure checks with carvedilol and ramipril.  They would like him to continue on simvastatin for hyperlipidemia and stroke risk reduction along with his baby aspirin.  No bleeding episodes.    Robaxin is helpful for nighttime spasms but he does have a lot of spasticity.    The following portions of the patient's history were reviewed and updated as appropriate: allergies, current medications, past family history, past medical history, past social history, past surgical history and problem list.    Review of Systems   Constitutional: Negative for appetite change, chills, fever and unexpected weight change.   HENT: Negative for hearing loss.    Eyes: Negative for visual disturbance.   Respiratory: Negative for chest tightness, shortness of breath and wheezing.    Cardiovascular: Negative for chest pain, palpitations and leg swelling.   Gastrointestinal: Negative for abdominal pain.   Musculoskeletal: Positive for gait  "problem and neck stiffness. Negative for arthralgias and back pain.   Skin: Negative for rash.   Neurological: Positive for weakness. Negative for dizziness, seizures and headaches.   Psychiatric/Behavioral: Positive for agitation. The patient is not nervous/anxious.         Mild agitation at baseline following his head injury and stroke       Objective  /84   Pulse 62   Ht 172.7 cm (68\")   Wt 69.4 kg (153 lb)   BMI 23.26 kg/m²     Physical Exam  Constitutional:       General: He is not in acute distress.     Comments: Resting in chair.  No acute distress   HENT:      Head: Normocephalic and atraumatic.      Right Ear: External ear normal.      Left Ear: External ear normal.      Nose: Nose normal.   Eyes:      Extraocular Movements: Extraocular movements intact.      Conjunctiva/sclera: Conjunctivae normal.      Pupils: Pupils are equal, round, and reactive to light.   Pulmonary:      Effort: Pulmonary effort is normal. No respiratory distress.   Musculoskeletal:         General: Normal range of motion.      Cervical back: Normal range of motion.   Skin:     Findings: No rash.   Neurological:      Mental Status: He is alert. Mental status is at baseline.   Psychiatric:      Comments: Mood stable with Zoloft.  No aggressive outbursts during visit or per patient's caregiver at home           Assessment/Plan   Diagnoses and all orders for this visit:    1. Contracture of multiple joints (Primary)  Assessment & Plan:  Discussed home physical and Occupational Therapy they would like to hold off on further therapy at this time given they feel he has maximized benefit in the past from home therapy.      2. Primary hypertension  Assessment & Plan:  Hypertension is improving with treatment.  Continue current treatment regimen.  Blood pressure will be reassessed at the next regular appointment.    Orders:  -     carvedilol (Coreg) 6.25 MG tablet; Take 1 tablet by mouth 2 (Two) Times a Day With Meals.  Dispense: 180 " tablet; Refill: 1  -     enalapril (Vasotec) 5 MG tablet; Take 1 tablet by mouth Daily.  Dispense: 90 tablet; Refill: 1    3. Muscle cramps  -     methocarbamol (ROBAXIN) 500 MG tablet; Take 1 tablet by mouth Every Night.  Dispense: 90 tablet; Refill: 1    4. Anxiety  Assessment & Plan:  Stable control with current regimen    Orders:  -     sertraline (ZOLOFT) 100 MG tablet; 2 tablets at night  Dispense: 180 tablet; Refill: 1    5. Hyperlipidemia, mixed  Assessment & Plan:  Lipid abnormalities are improving with treatment.  Pharmacotherapy as ordered.  Lipids will be reassessed in 6 months.    Orders:  -     simvastatin (ZOCOR) 40 MG tablet; Take 1 tablet by mouth Every Evening.  Dispense: 90 tablet; Refill: 1    6. History of stroke    7. Frontotemporal dementia      BMI is within normal parameters. No other follow-up for BMI required.       Return in about 3 months (around 7/10/2023) for Med recheck.    Matthew Wu MD

## 2023-04-10 NOTE — ASSESSMENT & PLAN NOTE
Discussed home physical and Occupational Therapy they would like to hold off on further therapy at this time given they feel he has maximized benefit in the past from home therapy.

## 2023-06-12 ENCOUNTER — TELEMEDICINE (OUTPATIENT)
Dept: FAMILY MEDICINE CLINIC | Facility: CLINIC | Age: 73
End: 2023-06-12
Payer: MEDICARE

## 2023-06-12 VITALS
HEIGHT: 68 IN | BODY MASS INDEX: 23.19 KG/M2 | DIASTOLIC BLOOD PRESSURE: 74 MMHG | WEIGHT: 153 LBS | SYSTOLIC BLOOD PRESSURE: 123 MMHG | HEART RATE: 56 BPM

## 2023-06-12 DIAGNOSIS — Z86.73 HISTORY OF STROKE: Chronic | ICD-10-CM

## 2023-06-12 DIAGNOSIS — F41.0 GENERALIZED ANXIETY DISORDER WITH PANIC ATTACKS: ICD-10-CM

## 2023-06-12 DIAGNOSIS — I10 PRIMARY HYPERTENSION: Primary | Chronic | ICD-10-CM

## 2023-06-12 DIAGNOSIS — R39.81 URINARY INCONTINENCE DUE TO COGNITIVE IMPAIRMENT: Chronic | ICD-10-CM

## 2023-06-12 DIAGNOSIS — R15.9 INCONTINENCE OF FECES, UNSPECIFIED FECAL INCONTINENCE TYPE: Chronic | ICD-10-CM

## 2023-06-12 DIAGNOSIS — R26.81 GAIT INSTABILITY: Chronic | ICD-10-CM

## 2023-06-12 DIAGNOSIS — F41.1 GENERALIZED ANXIETY DISORDER WITH PANIC ATTACKS: ICD-10-CM

## 2023-06-12 DIAGNOSIS — F02.80 FRONTOTEMPORAL DEMENTIA: Chronic | ICD-10-CM

## 2023-06-12 DIAGNOSIS — E78.2 HYPERLIPIDEMIA, MIXED: Chronic | ICD-10-CM

## 2023-06-12 DIAGNOSIS — G31.09 FRONTOTEMPORAL DEMENTIA: Chronic | ICD-10-CM

## 2023-06-12 PROBLEM — F41.9 ANXIETY: Chronic | Status: RESOLVED | Noted: 2022-07-14 | Resolved: 2023-06-12

## 2023-06-12 NOTE — ASSESSMENT & PLAN NOTE
Ongoing home therapy exercises that were left with his last episode of physical therapy.  At this time we do not feel additional therapy would add much help.  His wife does understand we can restart this at any time if interested.

## 2023-06-12 NOTE — ASSESSMENT & PLAN NOTE
Related to stroke and frontotemporal dementia.  His wife continues to be his primary caregiver and helps with his chronic incontinence of urine and feces.

## 2023-06-12 NOTE — PROGRESS NOTES
Patient was seen today through synchronous audio/video technology. Verbal consent was obtained. The patient was located at home. Vitals signs were obtained by patient and recorded.     I was located at our [facility] office for this telehealth visit.    Chief Complaint  No chief complaint on file.    History of Present Illness  Lui Rodriguez is a 73 y.o. male presenting via video-conference today for follow-up telehealth visit and medication refills.     Xavier is accompanied by his wife.  She is his primary caregiver after his stroke and fall with traumatic brain injury.     Overall has been doing well since his last appointment with no new significant problems.  He does have contractures developing due to chronic inactivity and they feel they have maximized benefit from home physical and occupational therapy.  We did discuss restarting therapy to help with contractures but they would like to hold off at this point on ongoing home therapy.     Mood stable with Zoloft at current dosing.  Good blood pressure checks with carvedilol and ramipril.  They would like him to continue on simvastatin for hyperlipidemia and stroke risk reduction along with his baby aspirin.  No bleeding episodes.     Robaxin is helpful for nighttime spasms but he does have a lot of spasticity.    The following portions of the patient's history were reviewed and updated as appropriate: allergies, current medications, past family history, past medical history, past social history, past surgical history and problem list.    Review of Systems   Constitutional: Negative for activity change, appetite change, chills, fever and unexpected weight change.   HENT: Negative for hearing loss.    Eyes: Negative for visual disturbance.   Respiratory: Negative for chest tightness, shortness of breath and wheezing.    Cardiovascular: Negative for chest pain, palpitations and leg swelling.   Gastrointestinal: Negative for  "abdominal pain.   Musculoskeletal: Positive for gait problem. Negative for arthralgias and back pain.   Skin: Negative for rash.   Neurological: Positive for speech difficulty and weakness. Negative for dizziness, seizures and headaches.   Psychiatric/Behavioral: Positive for agitation and confusion. The patient is not nervous/anxious.        Objective  /74   Pulse 56   Ht 172.7 cm (68\")   Wt 69.4 kg (153 lb)   BMI 23.26 kg/m²     Physical Exam  Constitutional:       General: He is not in acute distress.  HENT:      Head: Normocephalic and atraumatic.      Right Ear: External ear normal.      Left Ear: External ear normal.      Nose: Nose normal.   Eyes:      Extraocular Movements: Extraocular movements intact.      Conjunctiva/sclera: Conjunctivae normal.      Pupils: Pupils are equal, round, and reactive to light.   Pulmonary:      Effort: Pulmonary effort is normal. No respiratory distress.   Musculoskeletal:      Cervical back: Normal range of motion.   Skin:     Findings: No rash.   Neurological:      Mental Status: Mental status is at baseline.      Comments: At baseline.  Resting comfortably on couch   Psychiatric:      Comments: Mood at baseline following his traumatic brain injury and complications with frontotemporal dementia           Assessment/Plan   Diagnoses and all orders for this visit:    1. Primary hypertension (Primary)  Assessment & Plan:  Hypertension is improving with treatment.  Continue current treatment regimen.  Blood pressure will be reassessed in 3 months.      2. Hyperlipidemia, mixed  Assessment & Plan:  Lipid abnormalities are improving with treatment.  Pharmacotherapy as ordered.  Lipids will be reassessed in 3 months.      3. Generalized anxiety disorder with panic attacks  Assessment & Plan:  Psychological condition is unchanged.  Continue current treatment regimen.  Psychological condition  will be reassessed in 3 months.    Continue Zoloft.  Overall stable " control      4. Frontotemporal dementia  Assessment & Plan:  Unchanged after his stroke and also fall with subdural hematoma.  Continue Zoloft for anxiety.      5. History of stroke  Assessment & Plan:  Continue low-dose aspirin, Coreg, enalapril, and Zocor for stroke prevention      6. Urinary incontinence due to cognitive impairment  Assessment & Plan:  Continue use of protective undergarments.      7. Incontinence of feces, unspecified fecal incontinence type  Assessment & Plan:  Related to stroke and frontotemporal dementia.  His wife continues to be his primary caregiver and helps with his chronic incontinence of urine and feces.      8. Gait instability  Assessment & Plan:  Ongoing home therapy exercises that were left with his last episode of physical therapy.  At this time we do not feel additional therapy would add much help.  His wife does understand we can restart this at any time if interested.        BMI is within normal parameters. No other follow-up for BMI required.       Return in about 3 months (around 9/12/2023) for .    Matthew Wu MD

## 2023-06-12 NOTE — ASSESSMENT & PLAN NOTE
Psychological condition is unchanged.  Continue current treatment regimen.  Psychological condition  will be reassessed in 3 months.    Continue Zoloft.  Overall stable control

## 2023-08-19 PROCEDURE — 93295 DEV INTERROG REMOTE 1/2/MLT: CPT | Performed by: STUDENT IN AN ORGANIZED HEALTH CARE EDUCATION/TRAINING PROGRAM

## 2023-08-19 PROCEDURE — 93296 REM INTERROG EVL PM/IDS: CPT | Performed by: STUDENT IN AN ORGANIZED HEALTH CARE EDUCATION/TRAINING PROGRAM

## 2023-09-13 ENCOUNTER — TELEMEDICINE (OUTPATIENT)
Dept: FAMILY MEDICINE CLINIC | Facility: CLINIC | Age: 73
End: 2023-09-13
Payer: MEDICARE

## 2023-09-13 VITALS
BODY MASS INDEX: 18.94 KG/M2 | SYSTOLIC BLOOD PRESSURE: 121 MMHG | WEIGHT: 125 LBS | HEIGHT: 68 IN | HEART RATE: 66 BPM | DIASTOLIC BLOOD PRESSURE: 81 MMHG

## 2023-09-13 DIAGNOSIS — Z86.73 HISTORY OF STROKE: Chronic | ICD-10-CM

## 2023-09-13 DIAGNOSIS — R26.81 GAIT INSTABILITY: Chronic | ICD-10-CM

## 2023-09-13 DIAGNOSIS — M24.50 CONTRACTURE OF MULTIPLE JOINTS: Chronic | ICD-10-CM

## 2023-09-13 DIAGNOSIS — L22 DIAPER DERMATITIS: ICD-10-CM

## 2023-09-13 DIAGNOSIS — E78.2 HYPERLIPIDEMIA, MIXED: Chronic | ICD-10-CM

## 2023-09-13 DIAGNOSIS — S06.5XAA SDH (SUBDURAL HEMATOMA): Chronic | ICD-10-CM

## 2023-09-13 DIAGNOSIS — F02.80 FRONTOTEMPORAL DEMENTIA: Primary | Chronic | ICD-10-CM

## 2023-09-13 DIAGNOSIS — F41.1 GENERALIZED ANXIETY DISORDER WITH PANIC ATTACKS: Chronic | ICD-10-CM

## 2023-09-13 DIAGNOSIS — R25.2 MUSCLE CRAMPS: Chronic | ICD-10-CM

## 2023-09-13 DIAGNOSIS — G31.09 FRONTOTEMPORAL DEMENTIA: Primary | Chronic | ICD-10-CM

## 2023-09-13 DIAGNOSIS — R39.81 URINARY INCONTINENCE DUE TO COGNITIVE IMPAIRMENT: Chronic | ICD-10-CM

## 2023-09-13 DIAGNOSIS — F41.0 GENERALIZED ANXIETY DISORDER WITH PANIC ATTACKS: Chronic | ICD-10-CM

## 2023-09-13 DIAGNOSIS — R15.9 INCONTINENCE OF FECES, UNSPECIFIED FECAL INCONTINENCE TYPE: Chronic | ICD-10-CM

## 2023-09-13 DIAGNOSIS — I10 PRIMARY HYPERTENSION: Chronic | ICD-10-CM

## 2023-09-13 RX ORDER — SERTRALINE HYDROCHLORIDE 100 MG/1
TABLET, FILM COATED ORAL
Qty: 180 TABLET | Refills: 1 | Status: SHIPPED | OUTPATIENT
Start: 2023-09-13

## 2023-09-13 RX ORDER — ENALAPRIL MALEATE 5 MG/1
5 TABLET ORAL DAILY
Qty: 90 TABLET | Refills: 1 | Status: SHIPPED | OUTPATIENT
Start: 2023-09-13

## 2023-09-13 RX ORDER — CARVEDILOL 6.25 MG/1
6.25 TABLET ORAL 2 TIMES DAILY WITH MEALS
Qty: 180 TABLET | Refills: 1 | Status: SHIPPED | OUTPATIENT
Start: 2023-09-13

## 2023-09-13 RX ORDER — SIMVASTATIN 40 MG
40 TABLET ORAL EVERY EVENING
Qty: 90 TABLET | Refills: 1 | Status: SHIPPED | OUTPATIENT
Start: 2023-09-13

## 2023-09-13 RX ORDER — NYSTATIN 100000 U/G
1 CREAM TOPICAL 2 TIMES DAILY
Qty: 30 G | Refills: 5 | Status: SHIPPED | OUTPATIENT
Start: 2023-09-13

## 2023-09-13 RX ORDER — METHOCARBAMOL 500 MG/1
500 TABLET, FILM COATED ORAL NIGHTLY
Qty: 90 TABLET | Refills: 1 | Status: SHIPPED | OUTPATIENT
Start: 2023-09-13

## 2023-09-13 NOTE — PROGRESS NOTES
Patient was seen today through synchronous audio/video technology. Verbal consent was obtained. The patient was located at home. Vitals signs were obtained by patient and recorded.     I was located at our [facility] office for this telehealth visit.    Chief Complaint  Med Refill    History of Present Illness  Lui Rodriguez is a 73 y.o. male presenting via video-conference today for  follow-up telehealth visit and medication refills.     Xavier is accompanied by his wife.  She remains his primary caregiver after his stroke and fall with traumatic brain injury.     Overall has been doing well since his last appointment with no new significant problems.  She does note a small area of irritation along the upper gluteal crest that has been difficult to heal with topical Neosporin.  We did discuss this is a common area for yeast irritation and diaper dermatitis that he is in diapers 24/7.  She does have zinc oxide cream at home and we discussed mixing this in equal parts with nystatin and hydrocortisone to help this area heal and also help with moisture barrier protection.    Xavier does have contractures due to chronic inactivity and they feel they have maximized benefit from home physical and occupational therapy.  We did discuss restarting therapy to help with contractures but they would like to hold off at this time.    Mood stable with Zoloft at current dosing.  Good blood pressure checks with carvedilol and ramipril.  They would like him to continue on simvastatin for hyperlipidemia and stroke risk reduction along with his baby aspirin.  No bleeding episodes.     Robaxin is helpful for nighttime spasms but he does have a lot of spasticity.       The following portions of the patient's history were reviewed and updated as appropriate: allergies, current medications, past family history, past medical history, past social history, past surgical history and problem list.    Review of  "Systems   Constitutional:  Negative for appetite change, chills, fever and unexpected weight change.   HENT:  Negative for hearing loss.    Eyes:  Negative for visual disturbance.   Respiratory:  Negative for chest tightness, shortness of breath and wheezing.    Cardiovascular:  Negative for chest pain, palpitations and leg swelling.   Gastrointestinal:  Negative for abdominal pain.        Ongoing incontinence of bowel and bladder   Genitourinary:         Ongoing incontinence.  Wearing protective undergarments   Musculoskeletal:  Positive for gait problem.   Skin:  Negative for rash.        See HPI   Neurological:  Positive for speech difficulty and weakness. Negative for dizziness and headaches.   Psychiatric/Behavioral:  Negative for agitation, hallucinations and sleep disturbance. The patient is not nervous/anxious and is not hyperactive.      Objective  /81   Pulse 66   Ht 172.7 cm (68\")   Wt 56.7 kg (125 lb)   BMI 19.01 kg/m²     Physical Exam  Constitutional:       General: He is not in acute distress.     Appearance: He is ill-appearing.      Comments: Chronically ill-appearing after his stroke and intracranial hemorrhage.  At baseline.  Resting comfortably on the couch with his wife present during telehealth visit   HENT:      Head: Normocephalic and atraumatic.      Right Ear: External ear normal.      Left Ear: External ear normal.      Nose: Nose normal.   Eyes:      Extraocular Movements: Extraocular movements intact.      Conjunctiva/sclera: Conjunctivae normal.   Pulmonary:      Effort: Pulmonary effort is normal. No respiratory distress.   Musculoskeletal:      Cervical back: Normal range of motion.   Skin:     Findings: No rash.   Neurological:      Motor: Weakness present.      Coordination: Coordination abnormal.      Gait: Gait abnormal.   Psychiatric:      Comments: Mood unchanged and stable compared to last visits.  Anxiety is doing well with current regimen         Assessment/Plan "   Diagnoses and all orders for this visit:    1. Frontotemporal dementia (Primary)  Assessment & Plan:  Unchanged after his stroke and also fall with subdural hematoma.  Continue Zoloft for anxiety.      2. History of stroke  Assessment & Plan:  Continue low-dose aspirin, Coreg, enalapril, and Zocor for stroke prevention      3. SDH (subdural hematoma)  Assessment & Plan:  Up-to-date with follow-up CT findings with no progression of the subdural hematoma.  No ongoing follow-up at this time with neurology given his symptoms have been stable and are unlikely to improve further      4. Gait instability  Assessment & Plan:  Ongoing home therapy exercises that were left with his last episode of physical therapy.  At this time we do not feel additional therapy would add much help.  His wife does understand we can restart this at any time if interested.      5. Hyperlipidemia, mixed  Assessment & Plan:  Lipid abnormalities are improving with treatment.  Pharmacotherapy as ordered.  Lipids will be reassessed in 3 months.    Orders:  -     simvastatin (ZOCOR) 40 MG tablet; Take 1 tablet by mouth Every Evening.  Dispense: 90 tablet; Refill: 1    6. Muscle cramps  Assessment & Plan:  Stable control with Robaxin    Orders:  -     methocarbamol (ROBAXIN) 500 MG tablet; Take 1 tablet by mouth Every Night.  Dispense: 90 tablet; Refill: 1    7. Contracture of multiple joints  Assessment & Plan:  Continued use of contracture preventing AFO supports per phys therapy recommendations      8. Incontinence of feces, unspecified fecal incontinence type  Assessment & Plan:  Related to stroke and frontotemporal dementia.      His wife continues to be his primary caregiver and helps with his chronic incontinence of urine and feces.      9. Urinary incontinence due to cognitive impairment  Assessment & Plan:  Continue use of protective undergarments.      10. Generalized anxiety disorder with panic attacks  Assessment & Plan:  Psychological  condition is unchanged.  Continue current treatment regimen.  Psychological condition  will be reassessed in 3 months.    Continue Zoloft.  Overall stable control    Orders:  -     sertraline (ZOLOFT) 100 MG tablet; 2 tablets at night  Dispense: 180 tablet; Refill: 1    11. Diaper dermatitis  -     hydrocortisone 2.5 % cream; Apply 1 application  topically to the appropriate area as directed 2 (Two) Times a Day.  Dispense: 28 g; Refill: 5  -     nystatin (MYCOSTATIN) 060821 UNIT/GM cream; Apply 1 application  topically to the appropriate area as directed 2 (Two) Times a Day.  Dispense: 30 g; Refill: 5    12. Primary hypertension  -     carvedilol (Coreg) 6.25 MG tablet; Take 1 tablet by mouth 2 (Two) Times a Day With Meals.  Dispense: 180 tablet; Refill: 1  -     enalapril (Vasotec) 5 MG tablet; Take 1 tablet by mouth Daily.  Dispense: 90 tablet; Refill: 1        BMI is within normal parameters. No other follow-up for BMI required.       Return in about 3 months (around 12/13/2023) for Med recheck.    Matthew Wu MD

## 2023-09-13 NOTE — ASSESSMENT & PLAN NOTE
Up-to-date with follow-up CT findings with no progression of the subdural hematoma.  No ongoing follow-up at this time with neurology given his symptoms have been stable and are unlikely to improve further

## 2023-12-13 ENCOUNTER — TELEMEDICINE (OUTPATIENT)
Dept: FAMILY MEDICINE CLINIC | Facility: CLINIC | Age: 73
End: 2023-12-13
Payer: MEDICARE

## 2023-12-13 VITALS
BODY MASS INDEX: 18.94 KG/M2 | HEIGHT: 68 IN | DIASTOLIC BLOOD PRESSURE: 80 MMHG | WEIGHT: 125 LBS | SYSTOLIC BLOOD PRESSURE: 117 MMHG | HEART RATE: 61 BPM

## 2023-12-13 DIAGNOSIS — I25.5 ISCHEMIC CARDIOMYOPATHY: Chronic | ICD-10-CM

## 2023-12-13 DIAGNOSIS — L22 DIAPER DERMATITIS: ICD-10-CM

## 2023-12-13 DIAGNOSIS — Z00.00 GENERAL MEDICAL EXAM: Primary | ICD-10-CM

## 2023-12-13 DIAGNOSIS — R15.9 INCONTINENCE OF FECES, UNSPECIFIED FECAL INCONTINENCE TYPE: Chronic | ICD-10-CM

## 2023-12-13 DIAGNOSIS — R39.81 URINARY INCONTINENCE DUE TO COGNITIVE IMPAIRMENT: Chronic | ICD-10-CM

## 2023-12-13 DIAGNOSIS — R25.2 MUSCLE CRAMPS: Chronic | ICD-10-CM

## 2023-12-13 DIAGNOSIS — F41.1 GENERALIZED ANXIETY DISORDER WITH PANIC ATTACKS: Chronic | ICD-10-CM

## 2023-12-13 DIAGNOSIS — Z86.73 HISTORY OF STROKE: Chronic | ICD-10-CM

## 2023-12-13 DIAGNOSIS — M24.50 CONTRACTURE OF MULTIPLE JOINTS: Chronic | ICD-10-CM

## 2023-12-13 DIAGNOSIS — F02.80 FRONTOTEMPORAL DEMENTIA: Chronic | ICD-10-CM

## 2023-12-13 DIAGNOSIS — Z95.810 ICD (IMPLANTABLE CARDIOVERTER-DEFIBRILLATOR) IN PLACE: Chronic | ICD-10-CM

## 2023-12-13 DIAGNOSIS — F41.0 GENERALIZED ANXIETY DISORDER WITH PANIC ATTACKS: Chronic | ICD-10-CM

## 2023-12-13 DIAGNOSIS — G31.09 FRONTOTEMPORAL DEMENTIA: Chronic | ICD-10-CM

## 2023-12-13 DIAGNOSIS — E78.2 HYPERLIPIDEMIA, MIXED: Chronic | ICD-10-CM

## 2023-12-13 DIAGNOSIS — I10 PRIMARY HYPERTENSION: Chronic | ICD-10-CM

## 2023-12-13 DIAGNOSIS — R26.81 GAIT INSTABILITY: Chronic | ICD-10-CM

## 2023-12-13 DIAGNOSIS — S06.5XAA SDH (SUBDURAL HEMATOMA): Chronic | ICD-10-CM

## 2023-12-13 PROBLEM — R09.89 CHEST CONGESTION: Status: RESOLVED | Noted: 2022-12-13 | Resolved: 2023-12-13

## 2023-12-13 PROBLEM — R09.81 NASAL CONGESTION: Status: RESOLVED | Noted: 2022-10-27 | Resolved: 2023-12-13

## 2023-12-13 PROBLEM — E53.8 VITAMIN B12 DEFICIENCY: Chronic | Status: RESOLVED | Noted: 2022-07-14 | Resolved: 2023-12-13

## 2023-12-13 RX ORDER — NYSTATIN 100000 U/G
1 CREAM TOPICAL 2 TIMES DAILY
Qty: 90 G | Refills: 5 | Status: SHIPPED | OUTPATIENT
Start: 2023-12-13

## 2023-12-13 RX ORDER — METHOCARBAMOL 500 MG/1
500 TABLET, FILM COATED ORAL NIGHTLY
Qty: 90 TABLET | Refills: 1 | Status: SHIPPED | OUTPATIENT
Start: 2023-12-13

## 2023-12-13 RX ORDER — CARVEDILOL 6.25 MG/1
6.25 TABLET ORAL 2 TIMES DAILY WITH MEALS
Qty: 180 TABLET | Refills: 1 | Status: SHIPPED | OUTPATIENT
Start: 2023-12-13

## 2023-12-13 RX ORDER — SIMVASTATIN 40 MG
40 TABLET ORAL EVERY EVENING
Qty: 90 TABLET | Refills: 1 | Status: SHIPPED | OUTPATIENT
Start: 2023-12-13

## 2023-12-13 RX ORDER — SERTRALINE HYDROCHLORIDE 100 MG/1
TABLET, FILM COATED ORAL
Qty: 180 TABLET | Refills: 1 | Status: SHIPPED | OUTPATIENT
Start: 2023-12-13

## 2023-12-13 RX ORDER — ENALAPRIL MALEATE 5 MG/1
5 TABLET ORAL DAILY
Qty: 90 TABLET | Refills: 1 | Status: SHIPPED | OUTPATIENT
Start: 2023-12-13

## 2023-12-13 NOTE — PROGRESS NOTES
This was an audio and video enabled telemedicine encounter.       The ABCs of the Annual Wellness Visit  Subsequent Medicare Wellness Visit    Subjective    Lui Rodriguez is a 73 y.o. male who presents for a Subsequent Medicare Wellness Visit.    The following portions of the patient's history were reviewed and   updated as appropriate: allergies, current medications, past family history, past medical history, past social history, past surgical history, and problem list.    Compared to one year ago, the patient feels his physical   health is the same.    Compared to one year ago, the patient feels his mental   health is the same.    Recent Hospitalizations:  He was not admitted to the hospital during the last year.       Current Medical Providers:  Patient Care Team:  Matthew Wu MD as PCP - General (Family Medicine)    Outpatient Medications Prior to Visit   Medication Sig Dispense Refill    aspirin 81 MG EC tablet Take 1 tablet by mouth Daily.      carvedilol (Coreg) 6.25 MG tablet Take 1 tablet by mouth 2 (Two) Times a Day With Meals. 180 tablet 1    enalapril (Vasotec) 5 MG tablet Take 1 tablet by mouth Daily. 90 tablet 1    hydrocortisone 2.5 % cream Apply 1 application  topically to the appropriate area as directed 2 (Two) Times a Day. 28 g 5    methocarbamol (ROBAXIN) 500 MG tablet Take 1 tablet by mouth Every Night. 90 tablet 1    nystatin (MYCOSTATIN) 083974 UNIT/GM cream Apply 1 application  topically to the appropriate area as directed 2 (Two) Times a Day. 30 g 5    sertraline (ZOLOFT) 100 MG tablet 2 tablets at night 180 tablet 1    simvastatin (ZOCOR) 40 MG tablet Take 1 tablet by mouth Every Evening. 90 tablet 1     No facility-administered medications prior to visit.       No opioid medication identified on active medication list. I have reviewed chart for other potential  high risk medication/s and harmful drug interactions in the elderly.        Aspirin is on active medication list.  "Aspirin use is indicated based on review of current medical condition/s. Pros and cons of this therapy have been discussed today. Benefits of this medication outweigh potential harm.  Patient has been encouraged to continue taking this medication.  .      Patient Active Problem List   Diagnosis    ICD (implantable cardioverter-defibrillator) in place    Ischemic cardiomyopathy    Hypertension    History of stroke    SDH (subdural hematoma)    Frontotemporal dementia    Gait instability    Hyperlipidemia, mixed    Muscle cramps    Contracture of multiple joints    Incontinence of feces    Urinary incontinence due to cognitive impairment    Generalized anxiety disorder with panic attacks    Diaper dermatitis    General medical exam     Advance Care Planning   Advance Care Planning     Advance Directive is on file.  ACP discussion was held with the patient during this visit. Patient has an advance directive in EMR which is still valid.      Objective    Vitals:    23 0903 23 1444   BP:  117/80   Pulse:  61   Weight: 56.7 kg (125 lb)    Height: 172.7 cm (68\")      Estimated body mass index is 19.01 kg/m² as calculated from the following:    Height as of this encounter: 172.7 cm (68\").    Weight as of this encounter: 56.7 kg (125 lb).    BMI is within normal parameters. No other follow-up for BMI required.      Does the patient have evidence of cognitive impairment? Yes          HEALTH RISK ASSESSMENT    Smoking Status:  Social History     Tobacco Use   Smoking Status Former    Packs/day: 1.00    Years: 40.00    Additional pack years: 0.00    Total pack years: 40.00    Types: Cigarettes    Start date: 1968    Quit date: 2016    Years since quittin.9   Smokeless Tobacco Never     Alcohol Consumption:  Social History     Substance and Sexual Activity   Alcohol Use Not Currently     Fall Risk Screen:    STEADI Fall Risk Assessment was completed, and patient is at LOW risk for falls.Assessment " completed on:2023    Depression Screenin/10/2023     2:59 PM   PHQ-2/PHQ-9 Depression Screening   Little Interest or Pleasure in Doing Things 0-->not at all   Feeling Down, Depressed or Hopeless 0-->not at all   PHQ-9: Brief Depression Severity Measure Score 0       Health Habits and Functional and Cognitive Screenin/13/2023     2:00 PM   Functional & Cognitive Status   Do you have difficulty preparing food and eating? Yes   Do you have difficulty bathing yourself, getting dressed or grooming yourself? Yes   Do you have difficulty using the toilet? Yes   Do you have difficulty moving around from place to place? Yes   Do you have trouble with steps or getting out of a bed or a chair? Yes   Current Diet Well Balanced Diet   Dental Exam Not up to date   Eye Exam Not up to date   Exercise (times per week) 0 times per week   Current Exercises Include No Regular Exercise   Do you need help using the phone?  Yes   Are you deaf or do you have serious difficulty hearing?  No   Do you need help to go to places out of walking distance? Yes   Do you need help shopping? Yes   Do you need help preparing meals?  Yes   Do you need help with housework?  Yes   Do you need help with laundry? Yes   Do you need help taking your medications? Yes   Do you need help managing money? Yes   Do you ever drive or ride in a car without wearing a seat belt? No   Have you felt unusual stress, anger or loneliness in the last month? No   Who do you live with? Spouse   If you need help, do you have trouble finding someone available to you? No   Have you been bothered in the last four weeks by sexual problems? No   Do you have difficulty concentrating, remembering or making decisions? Yes       Age-appropriate Screening Schedule:  Refer to the list below for future screening recommendations based on patient's age, sex and/or medical conditions. Orders for these recommended tests are listed in the plan section. The patient has  been provided with a written plan.    Health Maintenance   Topic Date Due    ANNUAL WELLNESS VISIT  12/13/2024    AAA SCREEN (ONE-TIME)  Completed    HEPATITIS C SCREENING  Discontinued    COVID-19 Vaccine  Discontinued    INFLUENZA VACCINE  Discontinued    Pneumococcal Vaccine 65+  Discontinued    LIPID PANEL  Discontinued    TDAP/TD VACCINES  Discontinued    ZOSTER VACCINE  Discontinued    LUNG CANCER SCREENING  Discontinued    COLORECTAL CANCER SCREENING  Discontinued                  CMS Preventative Services Quick Reference  Risk Factors Identified During Encounter  Fall Risk-High or Moderate: Discussed Fall Prevention in the home  The above risks/problems have been discussed with the patient.  Pertinent information has been shared with the patient in the After Visit Summary.  An After Visit Summary and PPPS were made available to the patient.    Follow Up:   Next Medicare Wellness visit to be scheduled in 1 year.       Additional E&M Note during same encounter follows:  Patient has multiple medical problems which are significant and separately identifiable that require additional work above and beyond the Medicare Wellness Visit.      Chief Complaint  Hypertension and Hyperlipidemia    Subjective        HPI  Lui Rodriguez is also being seen today for telehealth followup visit and medication refills    Patient was seen today through synchronous audio/video technology. Verbal consent was obtained. The patient was located at home. Vitals signs were obtained by patient and recorded.     I was located at our [facility] office for this telehealth visit.    Chief Complaint  Hypertension and Hyperlipidemia and annual wellness visit.     History of Present Illness  Lui Rodriguez is a 73 y.o. male presenting via video-conference today for medicare annual wellness visit and for follow-up telehealth visit and medication refills.     Xavier is accompanied by his wife.  She remains his  primary caregiver after his stroke and fall with traumatic brain injury.     Overall has been doing well since his last appointment in September with no new significant problems.      He does note a small area of irritation along the upper gluteal crest that has been difficult to heal with topical Neosporin.  We did discuss this is a common area for yeast irritation and diaper dermatitis that he is in diapers 24/7.  She does have zinc oxide cream at home and we discussed mixing this in equal parts with nystatin and hydrocortisone to help this area heal and also help with moisture barrier protection.  This helped but the pharmacy only gives one small 28/30gm tube per month.  Would like qty updated.  It is improving.      Xavier does have contractures due to chronic inactivity and they feel they have maximized benefit from home physical and occupational therapy.  We did discuss restarting therapy to help with contractures but they would like to hold off at this time.     Mood stable with Zoloft at current dosing.  Good blood pressure checks with carvedilol and ramipril.  They would like him to continue on simvastatin for hyperlipidemia and stroke risk reduction along with his baby aspirin.  No bleeding episodes.     Robaxin is helpful for nighttime spasms but he does have a lot of spasticity.       The following portions of the patient's history were reviewed and updated as appropriate: allergies, current medications, past family history, past medical history, past social history, past surgical history and problem list.      Review of Systems   Constitutional:  Negative for activity change, appetite change, chills, fatigue and fever.   HENT:  Negative for ear pain, hearing loss and trouble swallowing.    Eyes:  Negative for pain and visual disturbance.   Respiratory:  Negative for cough, chest tightness, shortness of breath and wheezing.    Cardiovascular:  Negative for chest pain, palpitations and leg swelling.  "  Gastrointestinal:  Negative for abdominal pain and blood in stool.   Genitourinary:  Negative for difficulty urinating.   Musculoskeletal:  Positive for gait problem. Negative for arthralgias, back pain and joint swelling.   Skin:  Positive for rash.   Neurological:  Positive for weakness and confusion. Negative for dizziness, seizures and light-headedness.   Psychiatric/Behavioral:  Negative for agitation, behavioral problems, dysphoric mood and sleep disturbance.        Objective   Vital Signs:  /80   Pulse 61   Ht 172.7 cm (68\")   Wt 56.7 kg (125 lb)   BMI 19.01 kg/m²     Physical Exam  Constitutional:       General: He is not in acute distress.     Appearance: He is not ill-appearing.   HENT:      Head: Normocephalic and atraumatic.      Right Ear: External ear normal.      Left Ear: External ear normal.      Nose: Nose normal.   Eyes:      Extraocular Movements: Extraocular movements intact.      Conjunctiva/sclera: Conjunctivae normal.      Pupils: Pupils are equal, round, and reactive to light.   Pulmonary:      Effort: Pulmonary effort is normal. No respiratory distress.   Musculoskeletal:         General: Normal range of motion.      Cervical back: Normal range of motion.   Skin:     Findings: No rash.   Neurological:      Mental Status: He is alert. Mental status is at baseline. He is disoriented.      Motor: Weakness present.      Gait: Gait abnormal.   Psychiatric:      Comments: Mood at baseline.  No agitation.  Resting comfortably on his couch.                         Assessment and Plan   Diagnoses and all orders for this visit:    1. General medical exam (Primary)  Assessment & Plan:  Discussed together health maintenance and screening along with vaccination options and healthy diet and exercise habits as part of the preventative counseling at their physical exam today.       2. Diaper dermatitis  Assessment & Plan:  Refilled HC and nystatin to mix with zinc oxide otc.  Improving but not " resolved    Orders:  -     hydrocortisone 2.5 % cream; Apply 1 application  topically to the appropriate area as directed 2 (Two) Times a Day.  Dispense: 84 g; Refill: 5  -     nystatin (MYCOSTATIN) 728556 UNIT/GM cream; Apply 1 Application topically to the appropriate area as directed 2 (Two) Times a Day.  Dispense: 90 g; Refill: 5    3. Primary hypertension  Assessment & Plan:  Hypertension is improving with treatment.  Continue current treatment regimen.  Blood pressure will be reassessed in 3 months.    Orders:  -     carvedilol (Coreg) 6.25 MG tablet; Take 1 tablet by mouth 2 (Two) Times a Day With Meals.  Dispense: 180 tablet; Refill: 1  -     enalapril (Vasotec) 5 MG tablet; Take 1 tablet by mouth Daily.  Dispense: 90 tablet; Refill: 1    4. Generalized anxiety disorder with panic attacks  Assessment & Plan:  Psychological condition is unchanged.  Continue current treatment regimen.  Psychological condition  will be reassessed in 3 months.    Continue Zoloft.  Overall stable control    Orders:  -     sertraline (ZOLOFT) 100 MG tablet; 2 tablets at night  Dispense: 180 tablet; Refill: 1    5. Hyperlipidemia, mixed  Assessment & Plan:  Lipid abnormalities are improving with treatment.  Pharmacotherapy as ordered.  Lipids will be reassessed in 3 months.    Orders:  -     simvastatin (ZOCOR) 40 MG tablet; Take 1 tablet by mouth Every Evening.  Dispense: 90 tablet; Refill: 1    6. Muscle cramps  Assessment & Plan:  Stable control with Robaxin    Orders:  -     methocarbamol (ROBAXIN) 500 MG tablet; Take 1 tablet by mouth Every Night.  Dispense: 90 tablet; Refill: 1    7. Incontinence of feces, unspecified fecal incontinence type  Assessment & Plan:  Related to stroke and frontotemporal dementia.      His wife continues to be his primary caregiver and helps with his chronic incontinence of urine and feces.      8. Urinary incontinence due to cognitive impairment  Assessment & Plan:  Continue use of protective  undergarments.      9. Contracture of multiple joints  Assessment & Plan:  Continued use of contracture preventing AFO supports per phys therapy recommendations      10. Frontotemporal dementia  Assessment & Plan:  Unchanged after his stroke and also fall with subdural hematoma.  Continue Zoloft for anxiety.      11. Gait instability  Assessment & Plan:  Ongoing home therapy exercises that were left with his last episode of physical therapy.  At this time we do not feel additional therapy would add much help.  His wife does understand we can restart this at any time if interested.      12. SDH (subdural hematoma)  Assessment & Plan:  Up-to-date with follow-up CT findings with no progression of the subdural hematoma.  No ongoing follow-up at this time with neurology given his symptoms have been stable and are unlikely to improve further      13. History of stroke  Assessment & Plan:  Continue low-dose aspirin, Coreg, enalapril, and Zocor for stroke prevention      14. Ischemic cardiomyopathy  Assessment & Plan:  Doing well with medical management.      15. ICD (implantable cardioverter-defibrillator) in place             Follow Up   Return in about 4 months (around 4/13/2024) for Med recheck.  Patient was given instructions and counseling regarding his condition or for health maintenance advice. Please see specific information pulled into the AVS if appropriate.

## 2024-01-01 ENCOUNTER — TELEPHONE (OUTPATIENT)
Dept: FAMILY MEDICINE CLINIC | Facility: CLINIC | Age: 74
End: 2024-01-01
Payer: MEDICARE

## 2024-01-07 ENCOUNTER — PATIENT MESSAGE (OUTPATIENT)
Dept: FAMILY MEDICINE CLINIC | Facility: CLINIC | Age: 74
End: 2024-01-07
Payer: MEDICARE

## 2024-01-08 NOTE — TELEPHONE ENCOUNTER
From: Lui Rodriguez  To: Matthew Wu  Sent: 1/7/2024 5:56 PM EST  Subject: Update on Xavier-Blue toes and feet    Happy New Year Dr Wu,  I know we had discussed Xavier’s white knuckles and fingers, I wanted to also let you know that he has some swelling with blue toes and feet.  Thanks,  Abdelrahman

## 2024-01-17 ENCOUNTER — PATIENT MESSAGE (OUTPATIENT)
Dept: FAMILY MEDICINE CLINIC | Facility: CLINIC | Age: 74
End: 2024-01-17
Payer: MEDICARE

## 2024-01-18 NOTE — TELEPHONE ENCOUNTER
From: Lui Melgar Michael  To: Matthew Wu  Sent: 1/17/2024 8:35 PM EST  Subject: I believe Xavier has pneumonia     Hey Dr Wu, everyone in the family has been sick and I think it might be rsv. I Had equilibrium problems for a week and tanners nose ran for a week , Xavier’s 1st week symptoms were like mine very minimal, then on Sunday my nose started pouring and Norm had been told to take statist dm from [facility] so I used some of tanners statist dm and almost immediately I was bringing green clods out of my chest. I started giving to Xavier on Tuesday and he is not bringing anything up and I can hear his congestion , he has been lethargic today with me feeding and holding his cup for him. What do I need to do as his caregiver to provide him optimal comfort during the upcoming days . Should we stop the stayhist dm, what about his other meds? With his advanced dementia, we wont be proceeding with antibiotics as we had discussed, just want to make sure I am doing everything to keep him comfortable here at home and what to expect in the upcoming days. I appreciate all of the support you   have given us.  Thanks so much   Abdelrahman

## 2024-01-27 ENCOUNTER — OFFICE VISIT (OUTPATIENT)
Dept: FAMILY MEDICINE CLINIC | Facility: CLINIC | Age: 74
End: 2024-01-27
Payer: MEDICARE

## 2024-01-27 VITALS — BODY MASS INDEX: 17.94 KG/M2 | DIASTOLIC BLOOD PRESSURE: 50 MMHG | SYSTOLIC BLOOD PRESSURE: 120 MMHG | WEIGHT: 118 LBS

## 2024-01-27 DIAGNOSIS — E78.2 HYPERLIPIDEMIA, MIXED: Chronic | ICD-10-CM

## 2024-01-27 DIAGNOSIS — E44.1 MILD PROTEIN-CALORIE MALNUTRITION: ICD-10-CM

## 2024-01-27 DIAGNOSIS — R39.81 URINARY INCONTINENCE DUE TO COGNITIVE IMPAIRMENT: Chronic | ICD-10-CM

## 2024-01-27 DIAGNOSIS — R41.89 COGNITIVE DECLINE: ICD-10-CM

## 2024-01-27 DIAGNOSIS — G31.09 FRONTOTEMPORAL DEMENTIA: Chronic | ICD-10-CM

## 2024-01-27 DIAGNOSIS — R09.89 CHEST CONGESTION: Primary | ICD-10-CM

## 2024-01-27 DIAGNOSIS — S06.5XAA SDH (SUBDURAL HEMATOMA): Chronic | ICD-10-CM

## 2024-01-27 DIAGNOSIS — I10 PRIMARY HYPERTENSION: Chronic | ICD-10-CM

## 2024-01-27 DIAGNOSIS — F41.0 GENERALIZED ANXIETY DISORDER WITH PANIC ATTACKS: Chronic | ICD-10-CM

## 2024-01-27 DIAGNOSIS — F03.911 AGITATION DUE TO DEMENTIA: ICD-10-CM

## 2024-01-27 DIAGNOSIS — M24.50 CONTRACTURE OF MULTIPLE JOINTS: Chronic | ICD-10-CM

## 2024-01-27 DIAGNOSIS — F02.80 FRONTOTEMPORAL DEMENTIA: Chronic | ICD-10-CM

## 2024-01-27 DIAGNOSIS — Z53.20 FUNCTIONAL ASSESSMENT DECLINED: ICD-10-CM

## 2024-01-27 DIAGNOSIS — F41.1 GENERALIZED ANXIETY DISORDER WITH PANIC ATTACKS: Chronic | ICD-10-CM

## 2024-01-27 DIAGNOSIS — Z86.73 HISTORY OF STROKE: Chronic | ICD-10-CM

## 2024-01-27 DIAGNOSIS — R15.9 INCONTINENCE OF FECES, UNSPECIFIED FECAL INCONTINENCE TYPE: Chronic | ICD-10-CM

## 2024-01-27 NOTE — PROGRESS NOTES
Chief Complaint  Cough/congestion, Evaluate right buttock skin lesion, and Continued progressive functional and cognitive decline    Subjective    History of Present Illness:  Lui Rodriguez is a 73 y.o. male who presents today for ongoing problems with chest congestion throughout the past week.    He is here with his wife and both of his sons which are his primary caregivers at home following his episode of stroke complicated by fall and subdural hematoma.    He has had continued progressive worsening of his frontotemporal dementia and functional and cognitive decline following his stroke and head injury with subdural hematoma throughout the past year.    His wife and sons are in agreement to proceed with comfort care and do not want any active treatment for infection if he does have pneumonia.    They have not been able to get successful oxygen saturation levels at home due to his cold extremities.    He does require 24-hour care due to his immobility and has incontinence of bowel and bladder.    He has had several skin breakdown episodes due to his chronic sitting and laying in the bed and they have worked on rotation and different pressure-relief methods with good success with only 1 lesion remaining along the right upper buttock.    They would like this checked today.    His wife has held his blood pressure medications due to some low blood pressure readings at home but has recently restarted them given blood pressures were running higher.  He does continue on carvedilol and enalapril.    He has been on simvastatin for stroke prevention and we discussed at this point discontinuing simvastatin given concerns this may be contributing to some myalgias and also given the desired to proceed with comfort care.    He does continue to benefit with Robaxin for spasm relief and does have several contractures due to his history of stroke and subdural hematoma.    He is wheelchair-bound and requires assistance getting out  of the wheelchair.    We did discuss his continued progressive decline and his family is interested in referral for hospice to consider evaluation and assistance at home.  It is their desire to keep her on at home and continue with comfort care measures only.    Objective   Vital Signs:   /50   Wt 53.5 kg (118 lb)   BMI 17.94 kg/m²     Review of Systems   Constitutional:  Negative for appetite change, chills and fever.   HENT:  Negative for hearing loss.    Eyes:  Negative for blurred vision.   Respiratory:  Positive for cough. Negative for chest tightness and wheezing.    Cardiovascular:  Negative for chest pain and leg swelling.   Gastrointestinal:  Negative for abdominal pain and blood in stool.   Musculoskeletal:  Positive for gait problem.   Skin:  Positive for skin lesions. Negative for rash.   Neurological:  Positive for speech difficulty, weakness, memory problem and confusion.   Psychiatric/Behavioral:  Positive for agitation. Negative for depressed mood.         His wife does report over the past week with his chest congestion and illness some episodes of agitation at nighttime and calling out for his mom.       Past History:  Medical History: has a past medical history of Brain bleed, Chronic back pain, Coronary artery disease, Dementia, Dyslipidemia, Hip fracture, Hyperlipidemia, Hypertension, Ischemic cardiomyopathy, and Stroke.   Surgical History: has a past surgical history that includes Coronary artery bypass graft (10/2006); Neck surgery (1996); Cardiac pacemaker placement (05/25/2007); Pacemaker Replacement; Inguinal Hernia Repair (Right, 11/12/2020); and Partial hip arthroplasty.   Family History: family history includes Heart disease in his father; Stroke in his mother.   Social History: reports that he quit smoking about 8 years ago. His smoking use included cigarettes. He started smoking about 56 years ago. He has a 40.00 pack-year smoking history. He has never used smokeless tobacco.  He reports that he does not currently use alcohol. He reports that he does not use drugs.      Current Outpatient Medications:     aspirin 81 MG EC tablet, Take 1 tablet by mouth Daily., Disp: , Rfl:     carvedilol (Coreg) 6.25 MG tablet, Take 1 tablet by mouth 2 (Two) Times a Day With Meals., Disp: 180 tablet, Rfl: 1    enalapril (Vasotec) 5 MG tablet, Take 1 tablet by mouth Daily., Disp: 90 tablet, Rfl: 1    hydrocortisone 2.5 % cream, Apply 1 application  topically to the appropriate area as directed 2 (Two) Times a Day., Disp: 84 g, Rfl: 5    methocarbamol (ROBAXIN) 500 MG tablet, Take 1 tablet by mouth Every Night., Disp: 90 tablet, Rfl: 1    nystatin (MYCOSTATIN) 299021 UNIT/GM cream, Apply 1 Application topically to the appropriate area as directed 2 (Two) Times a Day., Disp: 90 g, Rfl: 5    sertraline (ZOLOFT) 100 MG tablet, 2 tablets at night, Disp: 180 tablet, Rfl: 1    Allergies: Mirtazapine    Physical Exam  Constitutional:       Appearance: He is ill-appearing.   HENT:      Right Ear: External ear normal.      Left Ear: External ear normal.      Nose: Nose normal.   Eyes:      Pupils: Pupils are equal, round, and reactive to light.   Cardiovascular:      Rate and Rhythm: Normal rate and regular rhythm.      Heart sounds: Normal heart sounds.   Pulmonary:      Effort: Pulmonary effort is normal.      Breath sounds: Rhonchi present.      Comments: Scattered rhonchi but overall good breath sounds throughout all lung fields with no evidence for lobar pneumonia on exam.  No wheezing on exam.  Decreased respiratory effort throughout exam due to cognitive decline.  No respiratory distress  Abdominal:      General: Abdomen is flat.      Palpations: Abdomen is soft.   Skin:     Comments: Focused exam along right upper buttock with 2 mm decubitus ulcer.  No evidence for cellulitis.  Excellent home care regarding skin and pressure ulcers noted.   Neurological:      Mental Status: He is disoriented.      Motor:  Weakness present.      Coordination: Coordination abnormal.      Gait: Gait abnormal.   Psychiatric:      Comments: Able to follow simple commands but his verbal responses have declined since our last telehealth visit          Result Review                   Assessment and Plan  Diagnoses and all orders for this visit:    1. Chest congestion (Primary)  Assessment & Plan:  Discussed no evidence on exam for lobar pneumonia but some mild chest congestion and scattered rhonchi.    His family does not wish to treat with antibiotics to prolong suffering at this time and does wish to continue with symptomatic relief and they do continue Mucinex over-the-counter.    Due to his extremities being cold we are unable to get a reliable pulse ox today.    He does not have any cyanosis or appear to be in any respiratory distress.    Discussed his progressive functional and continued cognitive decline.    We did discuss at length today his family's desire to continue with comfort care only measures and I do believe at this point with his continued decline he would benefit from hospice care with [facility] navigators.  His wife, and son's are all in agreement with proceeding with hospice care and we will work on referral further evaluation and hopeful acceptance into their program to offer assistance and comfort care for Xavier.          2. Functional assessment declined  Assessment & Plan:  See plan above    Orders:  -     Ambulatory Referral to Home Hospice    3. Cognitive decline  Assessment & Plan:  See plan above    Orders:  -     Ambulatory Referral to Home Hospice    4. Frontotemporal dementia  Assessment & Plan:  Psychological condition is worsening.  Referral for hospice care evaluation  Psychological condition  will be reassessed at the next regular appointment.      Discussed episodes of nighttime agitation at this point he is on the max dosing with Zoloft.  We did discuss the ability to add in Seroquel or even Ativan at  bedtime if needed and his wife will keep me posted regarding his symptoms    Working on hospice care evaluation for comfort care    Orders:  -     Ambulatory Referral to Home Hospice    5. SDH (subdural hematoma)  Assessment & Plan:  Up-to-date with follow-up CT findings with no progression of the subdural hematoma.  No ongoing follow-up at this time with neurology given his symptoms have been stable and are unlikely to improve further    Orders:  -     Ambulatory Referral to Home Hospice    6. History of stroke  Assessment & Plan:  We did discuss stopping his simvastatin given concerns this may be causing more harm than benefit due to his problems with contractures and immobility.    His family is in agreement with stopping his simvastatin at this time and proceeding with comfort care as above    Orders:  -     Ambulatory Referral to Home Hospice    7. Primary hypertension  Assessment & Plan:  Hypertension is improving with treatment.  Continue current treatment regimen.  Blood pressure will be reassessed in 3 months.    Discussed blood pressure goal and when to hold medications if he gets low blood pressure readings.          8. Hyperlipidemia, mixed  Assessment & Plan:  See above.  Shared decision making discussed today with patient's family and we will stop simvastatin      9. Contracture of multiple joints  Assessment & Plan:  Unchanged chronic contractures with no evidence for skin breakdown.    Orders:  -     Ambulatory Referral to Home Hospice    10. Incontinence of feces, unspecified fecal incontinence type  Assessment & Plan:  Continue protective undergarments.    Discussed ongoing topical treatment for his small right upper buttock sacral decubitus.  Given Tegaderm to use for skin protection and to allow for wound healing      11. Urinary incontinence due to cognitive impairment  Assessment & Plan:  Continue with protective undergarments      12. Generalized anxiety disorder with panic attacks  Assessment  & Plan:  See above.  Continue Zoloft with ability to add in Seroquel or Ativan if needed at bedtime for agitation      13. Agitation due to dementia  Assessment & Plan:  See plan above      14. Mild protein-calorie malnutrition  Assessment & Plan:  Patient's Body mass index is 17.94 kg/m².  BMI indicates mild protein-calorie malnutrition with health conditions related to an underlying condition that include see above . Weight issue is unchanged. BMI is is below average; no BMI management plan is appropriate.  BMI management for patient includes the following: treating the underlying disease process.                I spent 52 minutes caring for Lui on this date of service. This time includes time spent by me in the following activities:preparing for the visit, obtaining and/or reviewing a separately obtained history, performing a medically appropriate examination and/or evaluation , counseling and educating the patient/family/caregiver, documenting information in the medical record, and care coordination    Follow Up  Return if symptoms worsen or fail to improve.    Matthew Wu MD

## 2024-01-27 NOTE — ASSESSMENT & PLAN NOTE
See above.  Continue Zoloft with ability to add in Seroquel or Ativan if needed at bedtime for agitation

## 2024-01-27 NOTE — ASSESSMENT & PLAN NOTE
See above.  Shared decision making discussed today with patient's family and we will stop simvastatin

## 2024-01-27 NOTE — ASSESSMENT & PLAN NOTE
Discussed no evidence on exam for lobar pneumonia but some mild chest congestion and scattered rhonchi.    His family does not wish to treat with antibiotics to prolong suffering at this time and does wish to continue with symptomatic relief and they do continue Mucinex over-the-counter.    Due to his extremities being cold we are unable to get a reliable pulse ox today.    He does not have any cyanosis or appear to be in any respiratory distress.    Discussed his progressive functional and continued cognitive decline.    We did discuss at length today his family's desire to continue with comfort care only measures and I do believe at this point with his continued decline he would benefit from hospice care with [facility] navigators.  His wife, and son's are all in agreement with proceeding with hospice care and we will work on referral further evaluation and hopeful acceptance into their program to offer assistance and comfort care for Xavier.

## 2024-01-27 NOTE — ASSESSMENT & PLAN NOTE
Hypertension is improving with treatment.  Continue current treatment regimen.  Blood pressure will be reassessed in 3 months.    Discussed blood pressure goal and when to hold medications if he gets low blood pressure readings.

## 2024-01-27 NOTE — ASSESSMENT & PLAN NOTE
We did discuss stopping his simvastatin given concerns this may be causing more harm than benefit due to his problems with contractures and immobility.    His family is in agreement with stopping his simvastatin at this time and proceeding with comfort care as above

## 2024-01-27 NOTE — ASSESSMENT & PLAN NOTE
Patient's Body mass index is 17.94 kg/m².  BMI indicates mild protein-calorie malnutrition with health conditions related to an underlying condition that include see above . Weight issue is unchanged. BMI is is below average; no BMI management plan is appropriate.  BMI management for patient includes the following: treating the underlying disease process.

## 2024-01-27 NOTE — ASSESSMENT & PLAN NOTE
Continue protective undergarments.    Discussed ongoing topical treatment for his small right upper buttock sacral decubitus.  Given Tegaderm to use for skin protection and to allow for wound healing

## 2024-01-27 NOTE — ASSESSMENT & PLAN NOTE
Psychological condition is worsening.  Referral for hospice care evaluation  Psychological condition  will be reassessed at the next regular appointment.      Discussed episodes of nighttime agitation at this point he is on the max dosing with Zoloft.  We did discuss the ability to add in Seroquel or even Ativan at bedtime if needed and his wife will keep me posted regarding his symptoms    Working on hospice care evaluation for comfort care

## 2024-01-29 ENCOUNTER — TELEPHONE (OUTPATIENT)
Dept: FAMILY MEDICINE CLINIC | Facility: CLINIC | Age: 74
End: 2024-01-29
Payer: MEDICARE

## 2024-04-04 NOTE — TELEPHONE ENCOUNTER
Called and discussed his passing with his wife.  His wife and sons took EXCEPTIONAL care of Xavier. MHB

## 2024-04-06 NOTE — ASSESSMENT & PLAN NOTE
Hypertension is improving with treatment.  Continue current treatment regimen.  Blood pressure will be reassessed at the next regular appointment.   Yes

## (undated) DEVICE — APPL CHLORAPREP W/TINT 26ML BLU

## (undated) DEVICE — ANTIBACTERIAL UNDYED BRAIDED (POLYGLACTIN 910), SYNTHETIC ABSORBABLE SUTURE: Brand: COATED VICRYL

## (undated) DEVICE — SUT MNCRYL PLS ANTIB UD 4/0 PS2 18IN

## (undated) DEVICE — PK MINOR SPLT 10

## (undated) DEVICE — SUT SILK 2/0 SH 30IN K833H

## (undated) DEVICE — PENROSE DRAIN 18 X .5" SILICONE: Brand: MEDLINE

## (undated) DEVICE — CVR HNDL LIGHT RIGID

## (undated) DEVICE — GLV SURG GRN DERMASSURE LF PF 7.5

## (undated) DEVICE — GLV SURG BIOGEL LTX PF 7

## (undated) DEVICE — 3M™ STERI-STRIP™ REINFORCED ADHESIVE SKIN CLOSURES, R1547, 1/2 IN X 4 IN (12 MM X 100 MM), 6 STRIPS/ENVELOPE: Brand: 3M™ STERI-STRIP™

## (undated) DEVICE — KITTNER SPONGE: Brand: DEROYAL

## (undated) DEVICE — 3M™ IOBAN™ 2 ANTIMICROBIAL INCISE DRAPE 6650EZ: Brand: IOBAN™ 2

## (undated) DEVICE — DRSNG SURESITE WNDW 4X4.5

## (undated) DEVICE — SUT NUROLON 0 MO7 CR8 18IN C541D